# Patient Record
Sex: FEMALE | Race: WHITE | NOT HISPANIC OR LATINO | Employment: OTHER | URBAN - METROPOLITAN AREA
[De-identification: names, ages, dates, MRNs, and addresses within clinical notes are randomized per-mention and may not be internally consistent; named-entity substitution may affect disease eponyms.]

---

## 2017-09-22 ENCOUNTER — HOSPITAL ENCOUNTER (OUTPATIENT)
Dept: RADIOLOGY | Facility: MEDICAL CENTER | Age: 47
End: 2017-09-22
Attending: FAMILY MEDICINE
Payer: COMMERCIAL

## 2017-09-22 DIAGNOSIS — Z12.31 ENCOUNTER FOR SCREENING MAMMOGRAM FOR MALIGNANT NEOPLASM OF BREAST: ICD-10-CM

## 2017-09-22 DIAGNOSIS — R14.0 ABDOMINAL DISTENTION: ICD-10-CM

## 2017-09-22 PROCEDURE — G0202 SCR MAMMO BI INCL CAD: HCPCS

## 2017-09-29 ENCOUNTER — HOSPITAL ENCOUNTER (OUTPATIENT)
Dept: RADIOLOGY | Facility: MEDICAL CENTER | Age: 47
End: 2017-09-29
Attending: FAMILY MEDICINE
Payer: COMMERCIAL

## 2017-09-29 DIAGNOSIS — R14.0 ABDOMINAL DISTENTION: ICD-10-CM

## 2017-09-29 DIAGNOSIS — N92.6 IRREGULAR MENSTRUATION: ICD-10-CM

## 2017-09-29 PROCEDURE — 76700 US EXAM ABDOM COMPLETE: CPT

## 2017-09-29 PROCEDURE — 76830 TRANSVAGINAL US NON-OB: CPT

## 2017-10-11 ENCOUNTER — HOSPITAL ENCOUNTER (OUTPATIENT)
Dept: RADIOLOGY | Facility: MEDICAL CENTER | Age: 47
End: 2017-10-11

## 2017-11-30 ENCOUNTER — HOSPITAL ENCOUNTER (OUTPATIENT)
Dept: LAB | Facility: MEDICAL CENTER | Age: 47
End: 2017-11-30
Attending: FAMILY MEDICINE
Payer: COMMERCIAL

## 2017-11-30 LAB
T4 SERPL-MCNC: 6.9 UG/DL (ref 4–12)
TSH SERPL DL<=0.005 MIU/L-ACNC: 2.81 UIU/ML (ref 0.3–3.7)

## 2017-11-30 PROCEDURE — 84479 ASSAY OF THYROID (T3 OR T4): CPT

## 2017-11-30 PROCEDURE — 84443 ASSAY THYROID STIM HORMONE: CPT

## 2017-11-30 PROCEDURE — 36415 COLL VENOUS BLD VENIPUNCTURE: CPT

## 2017-11-30 PROCEDURE — 84436 ASSAY OF TOTAL THYROXINE: CPT

## 2017-12-02 LAB — T3RU NFR SERPL: 37 % (ref 28–41)

## 2018-01-03 ENCOUNTER — HOSPITAL ENCOUNTER (OUTPATIENT)
Dept: RADIOLOGY | Facility: MEDICAL CENTER | Age: 48
End: 2018-01-03
Attending: FAMILY MEDICINE
Payer: COMMERCIAL

## 2018-01-03 DIAGNOSIS — R92.8 ABNORMAL MAMMOGRAM: ICD-10-CM

## 2018-01-03 PROCEDURE — 76642 ULTRASOUND BREAST LIMITED: CPT | Mod: LT

## 2018-01-03 PROCEDURE — 77065 DX MAMMO INCL CAD UNI: CPT | Mod: LT

## 2018-03-02 ENCOUNTER — OFFICE VISIT (OUTPATIENT)
Dept: MEDICAL GROUP | Facility: MEDICAL CENTER | Age: 48
End: 2018-03-02
Payer: COMMERCIAL

## 2018-03-02 VITALS
RESPIRATION RATE: 16 BRPM | SYSTOLIC BLOOD PRESSURE: 112 MMHG | HEART RATE: 92 BPM | DIASTOLIC BLOOD PRESSURE: 64 MMHG | HEIGHT: 68 IN | TEMPERATURE: 97.8 F | OXYGEN SATURATION: 97 % | BODY MASS INDEX: 21.82 KG/M2 | WEIGHT: 144 LBS

## 2018-03-02 DIAGNOSIS — Z85.850 HISTORY OF THYROID CANCER: ICD-10-CM

## 2018-03-02 DIAGNOSIS — M79.10 MYALGIA: ICD-10-CM

## 2018-03-02 DIAGNOSIS — R07.9 CHEST PAIN, UNSPECIFIED TYPE: ICD-10-CM

## 2018-03-02 DIAGNOSIS — F51.01 PRIMARY INSOMNIA: ICD-10-CM

## 2018-03-02 DIAGNOSIS — Z13.220 SCREENING, LIPID: ICD-10-CM

## 2018-03-02 DIAGNOSIS — M25.50 ARTHRALGIA, UNSPECIFIED JOINT: ICD-10-CM

## 2018-03-02 DIAGNOSIS — D64.9 ANEMIA, UNSPECIFIED TYPE: ICD-10-CM

## 2018-03-02 DIAGNOSIS — E03.9 ACQUIRED HYPOTHYROIDISM: ICD-10-CM

## 2018-03-02 PROCEDURE — 99204 OFFICE O/P NEW MOD 45 MIN: CPT | Mod: 25 | Performed by: INTERNAL MEDICINE

## 2018-03-02 PROCEDURE — 93000 ELECTROCARDIOGRAM COMPLETE: CPT | Performed by: INTERNAL MEDICINE

## 2018-03-02 RX ORDER — LEVOTHYROXINE SODIUM 88 UG/1
TABLET ORAL
Status: ON HOLD | COMMUNITY
Start: 2018-02-02 | End: 2020-03-27 | Stop reason: SDUPTHER

## 2018-03-02 RX ORDER — LIOTHYRONINE SODIUM 5 UG/1
1 TABLET ORAL DAILY
COMMUNITY
Start: 2018-02-02 | End: 2018-04-13 | Stop reason: SDUPTHER

## 2018-03-02 ASSESSMENT — PATIENT HEALTH QUESTIONNAIRE - PHQ9: CLINICAL INTERPRETATION OF PHQ2 SCORE: 0

## 2018-03-02 NOTE — PROGRESS NOTES
CC: Establishing care arthralgias, hypothyroidism. .    HPI:  Rafaela presents with the following    1. Acquired hypothyroidism  Maintain on medications labs recent checked found to be slightly under replaced maintained on dual therapy. She is complaining of fatigue but no hair or skin changes    2. Arthralgia, unspecified joint  She is complaining of generalized myalgias as well as arthralgias. Affecting multiple joints and legs. She does report her sleep is difficult and she's had stresses with her 's health recently. Mother does have a history of lupus.    3. Myalgia  As discussed in #2.    4. History of thyroid cancer  No signs of metastasis.    5. Screening, lipid      6. Anemia, unspecified type  She does report she is having abnormal bleeding followed by gynecology has been slightly anemic.    7. Primary insomnia  She reports she does not wake up feeling rested but does not have a heavy snoring history.  does not claim that she stops breathing either. She does report some mild depressive symptoms because of her 's health recently diagnosed with prostate cancer.    8. Chest pain, unspecified type  Also complaining of several years of chest pain. In the left upper chest. She called at heart pain. She denies any pain with activity usually very short nature lasting seconds. Denies any radiation of symptoms and no nausea or vomiting. No associated shortness of breath      Patient Active Problem List    Diagnosis Date Noted   • Acquired hypothyroidism 03/02/2018   • History of thyroid cancer 03/02/2018   • Primary insomnia 03/02/2018       Current Outpatient Prescriptions   Medication Sig Dispense Refill   • levothyroxine (SYNTHROID) 88 MCG Tab      • liothyronine (CYTOMEL) 5 MCG Tab Take 1 Tab by mouth every day.       No current facility-administered medications for this visit.          Allergies as of 03/02/2018 - Reviewed 03/02/2018   Allergen Reaction Noted   • Anesthesia s-i-40  "[propofol]  03/02/2018        Social History     Social History   • Marital status:      Spouse name: N/A   • Number of children: N/A   • Years of education: N/A     Occupational History   • Not on file.     Social History Main Topics   • Smoking status: Never Smoker   • Smokeless tobacco: Never Used   • Alcohol use Yes      Comment: 1-2 glasses of wine/weekly   • Drug use: Unknown   • Sexual activity: Not on file     Other Topics Concern   • Not on file     Social History Narrative   • No narrative on file       Family History   Problem Relation Age of Onset   • Heart Disease Mother    • Cancer Mother    • Cancer Father    • Alcohol/Drug Brother        History reviewed. No pertinent surgical history.    ROS:  Denies any Headache,,  Shortness of breath,  Abdominal pain, Changes of bowel or bladder, Lower ext edema, Fevers, Nights sweats, Weight Changes, Focal weakness or numbness.  All other systems are negative.    /64   Pulse 92   Temp 36.6 °C (97.8 °F)   Resp 16   Ht 1.715 m (5' 7.5\")   Wt 65.3 kg (144 lb)   SpO2 97%   BMI 22.22 kg/m²      Physical Exam:  Gen:         Alert and oriented, No apparent distress.  HEENT:   Perrla, TM clear,  Oralpharynx no erythema or exudates.  Neck:       No Jugular venous distension, Lymphadenopathy, Thyromegaly, Bruits.  Lungs:     Clear to auscultation bilaterally  CV:          Regular rate and rhythm. No murmurs, rubs or gallops.  Abd:         Soft non tender, non distended. Normal active bowel sounds.             Ext:          No clubbing, cyanosis, edema.    EKG:  Normal sinus rythm, no acute st-t wave changes.    Assessment and Plan.   47 y.o. female presenting with the following.     1. Acquired hypothyroidism  Recheck blood work refilled medications.  - levothyroxine (SYNTHROID) 88 MCG Tab;   - liothyronine (CYTOMEL) 5 MCG Tab; Take 1 Tab by mouth every day.  - TSH; Future    2. Arthralgia, unspecified joint  Setting for autoimmune workup.  - CBC WITH " DIFFERENTIAL; Future  - RHEUMATOID ARTHRITIS FACTOR; Future  - WESTERGREN SED RATE; Future  - CCP ANTIBODY; Future  - ANU ANTIBODY WITH REFLEX; Future    3. Myalgia  Suspect components of fibromyalgia given lack of sleep and mood will await results of testing and see back when complete.    4. History of thyroid cancer  Manage expectantly    5. Screening, lipid    - COMP METABOLIC PANEL; Future  - LIPID PROFILE; Future    6. Anemia, unspecified type  Per report slightly anemic sent for workup  - VITAMIN B12; Future  - FOLATE; Future  - FERRITIN; Future  - IRON/TOTAL IRON BIND; Future    7. Primary insomnia  She will try melatonin over-the-counter certainly could be a large part of her fatigue.    8. Chest pain, unspecified type  EKG normal in office if develops further symptoms may consider stress echo but feel unlikely cardiac in nature.  - EKG - Clinic performed

## 2018-03-09 ENCOUNTER — HOSPITAL ENCOUNTER (OUTPATIENT)
Dept: LAB | Facility: MEDICAL CENTER | Age: 48
End: 2018-03-09
Attending: INTERNAL MEDICINE
Payer: COMMERCIAL

## 2018-03-09 DIAGNOSIS — E03.9 ACQUIRED HYPOTHYROIDISM: ICD-10-CM

## 2018-03-09 DIAGNOSIS — M25.50 ARTHRALGIA, UNSPECIFIED JOINT: ICD-10-CM

## 2018-03-09 DIAGNOSIS — D64.9 ANEMIA, UNSPECIFIED TYPE: ICD-10-CM

## 2018-03-09 DIAGNOSIS — Z13.220 SCREENING, LIPID: ICD-10-CM

## 2018-03-09 LAB
ALBUMIN SERPL BCP-MCNC: 4 G/DL (ref 3.2–4.9)
ALBUMIN/GLOB SERPL: 1.4 G/DL
ALP SERPL-CCNC: 30 U/L (ref 30–99)
ALT SERPL-CCNC: 11 U/L (ref 2–50)
ANION GAP SERPL CALC-SCNC: 8 MMOL/L (ref 0–11.9)
AST SERPL-CCNC: 14 U/L (ref 12–45)
BASOPHILS # BLD AUTO: 0.8 % (ref 0–1.8)
BASOPHILS # BLD: 0.05 K/UL (ref 0–0.12)
BILIRUB SERPL-MCNC: 0.7 MG/DL (ref 0.1–1.5)
BUN SERPL-MCNC: 13 MG/DL (ref 8–22)
CALCIUM SERPL-MCNC: 9.4 MG/DL (ref 8.5–10.5)
CHLORIDE SERPL-SCNC: 105 MMOL/L (ref 96–112)
CHOLEST SERPL-MCNC: 129 MG/DL (ref 100–199)
CO2 SERPL-SCNC: 23 MMOL/L (ref 20–33)
CREAT SERPL-MCNC: 0.79 MG/DL (ref 0.5–1.4)
EOSINOPHIL # BLD AUTO: 0.2 K/UL (ref 0–0.51)
EOSINOPHIL NFR BLD: 3.1 % (ref 0–6.9)
ERYTHROCYTE [DISTWIDTH] IN BLOOD BY AUTOMATED COUNT: 47.3 FL (ref 35.9–50)
ERYTHROCYTE [SEDIMENTATION RATE] IN BLOOD BY WESTERGREN METHOD: 13 MM/HOUR (ref 0–20)
FERRITIN SERPL-MCNC: 8.1 NG/ML (ref 10–291)
FOLATE SERPL-MCNC: 10.9 NG/ML
GLOBULIN SER CALC-MCNC: 2.9 G/DL (ref 1.9–3.5)
GLUCOSE SERPL-MCNC: 90 MG/DL (ref 65–99)
HCT VFR BLD AUTO: 40.2 % (ref 37–47)
HDLC SERPL-MCNC: 74 MG/DL
HGB BLD-MCNC: 13.3 G/DL (ref 12–16)
IMM GRANULOCYTES # BLD AUTO: 0.02 K/UL (ref 0–0.11)
IMM GRANULOCYTES NFR BLD AUTO: 0.3 % (ref 0–0.9)
IRON SATN MFR SERPL: 12 % (ref 15–55)
IRON SERPL-MCNC: 51 UG/DL (ref 40–170)
LDLC SERPL CALC-MCNC: 47 MG/DL
LYMPHOCYTES # BLD AUTO: 1.1 K/UL (ref 1–4.8)
LYMPHOCYTES NFR BLD: 17.2 % (ref 22–41)
MCH RBC QN AUTO: 30 PG (ref 27–33)
MCHC RBC AUTO-ENTMCNC: 33.1 G/DL (ref 33.6–35)
MCV RBC AUTO: 90.5 FL (ref 81.4–97.8)
MONOCYTES # BLD AUTO: 0.5 K/UL (ref 0–0.85)
MONOCYTES NFR BLD AUTO: 7.8 % (ref 0–13.4)
NEUTROPHILS # BLD AUTO: 4.54 K/UL (ref 2–7.15)
NEUTROPHILS NFR BLD: 70.8 % (ref 44–72)
NRBC # BLD AUTO: 0 K/UL
NRBC BLD-RTO: 0 /100 WBC
PLATELET # BLD AUTO: 258 K/UL (ref 164–446)
PMV BLD AUTO: 10.2 FL (ref 9–12.9)
POTASSIUM SERPL-SCNC: 3.9 MMOL/L (ref 3.6–5.5)
PROT SERPL-MCNC: 6.9 G/DL (ref 6–8.2)
RBC # BLD AUTO: 4.44 M/UL (ref 4.2–5.4)
RHEUMATOID FACT SER IA-ACNC: <10 IU/ML (ref 0–14)
SODIUM SERPL-SCNC: 136 MMOL/L (ref 135–145)
TIBC SERPL-MCNC: 416 UG/DL (ref 250–450)
TRIGL SERPL-MCNC: 39 MG/DL (ref 0–149)
TSH SERPL DL<=0.005 MIU/L-ACNC: 0.12 UIU/ML (ref 0.38–5.33)
VIT B12 SERPL-MCNC: 465 PG/ML (ref 211–911)
WBC # BLD AUTO: 6.4 K/UL (ref 4.8–10.8)

## 2018-03-09 PROCEDURE — 86431 RHEUMATOID FACTOR QUANT: CPT

## 2018-03-09 PROCEDURE — 83550 IRON BINDING TEST: CPT

## 2018-03-09 PROCEDURE — 85652 RBC SED RATE AUTOMATED: CPT

## 2018-03-09 PROCEDURE — 84443 ASSAY THYROID STIM HORMONE: CPT

## 2018-03-09 PROCEDURE — 86039 ANTINUCLEAR ANTIBODIES (ANA): CPT

## 2018-03-09 PROCEDURE — 36415 COLL VENOUS BLD VENIPUNCTURE: CPT

## 2018-03-09 PROCEDURE — 86200 CCP ANTIBODY: CPT

## 2018-03-09 PROCEDURE — 82746 ASSAY OF FOLIC ACID SERUM: CPT

## 2018-03-09 PROCEDURE — 86235 NUCLEAR ANTIGEN ANTIBODY: CPT | Mod: 91

## 2018-03-09 PROCEDURE — 86038 ANTINUCLEAR ANTIBODIES: CPT

## 2018-03-09 PROCEDURE — 85025 COMPLETE CBC W/AUTO DIFF WBC: CPT

## 2018-03-09 PROCEDURE — 80061 LIPID PANEL: CPT

## 2018-03-09 PROCEDURE — 86225 DNA ANTIBODY NATIVE: CPT

## 2018-03-09 PROCEDURE — 83540 ASSAY OF IRON: CPT

## 2018-03-09 PROCEDURE — 82728 ASSAY OF FERRITIN: CPT

## 2018-03-09 PROCEDURE — 82607 VITAMIN B-12: CPT

## 2018-03-09 PROCEDURE — 80053 COMPREHEN METABOLIC PANEL: CPT

## 2018-03-11 LAB — CCP IGG SERPL-ACNC: 4 UNITS (ref 0–19)

## 2018-03-14 LAB
NUCLEAR IGG SER QL IA: DETECTED
NUCLEAR IGG TITR SER IF: ABNORMAL {TITER}

## 2018-04-13 ENCOUNTER — OFFICE VISIT (OUTPATIENT)
Dept: MEDICAL GROUP | Facility: MEDICAL CENTER | Age: 48
End: 2018-04-13
Payer: COMMERCIAL

## 2018-04-13 VITALS
TEMPERATURE: 98.6 F | HEIGHT: 68 IN | DIASTOLIC BLOOD PRESSURE: 60 MMHG | WEIGHT: 147 LBS | BODY MASS INDEX: 22.28 KG/M2 | SYSTOLIC BLOOD PRESSURE: 102 MMHG | OXYGEN SATURATION: 96 % | RESPIRATION RATE: 16 BRPM | HEART RATE: 74 BPM

## 2018-04-13 DIAGNOSIS — M79.10 MYALGIA: ICD-10-CM

## 2018-04-13 DIAGNOSIS — Z12.83 SKIN CANCER SCREENING: ICD-10-CM

## 2018-04-13 DIAGNOSIS — D50.9 IRON DEFICIENCY ANEMIA, UNSPECIFIED IRON DEFICIENCY ANEMIA TYPE: ICD-10-CM

## 2018-04-13 DIAGNOSIS — E03.9 ACQUIRED HYPOTHYROIDISM: ICD-10-CM

## 2018-04-13 PROCEDURE — 99214 OFFICE O/P EST MOD 30 MIN: CPT | Performed by: INTERNAL MEDICINE

## 2018-04-13 RX ORDER — LIOTHYRONINE SODIUM 5 UG/1
2.5 TABLET ORAL DAILY
Qty: 30 TAB | Refills: 111
Start: 2018-04-13 | End: 2020-05-01 | Stop reason: SDUPTHER

## 2018-04-13 NOTE — PROGRESS NOTES
"CC: Follow-up thyroid myalgias.    HPI:   Rafaela presents today with the following.    1. Acquired hypothyroidism  Slightly over replaced maintain on dual therapy. Denying any here skin changes no palpitations.    2. Iron deficiency anemia, unspecified iron deficiency anemia type  Slightly iron deficient despite taking pills she is being followed by gynecology for irregular bleeding.    3. Myalgia  Myalgias still present ANU was positive however a titer of 1:80. She is noticing her muscle aches are activity depended with things such as shoveling snow and she is not overwhelmed by a specific location of pain.          Patient Active Problem List    Diagnosis Date Noted   • Iron deficiency anemia 04/13/2018   • Acquired hypothyroidism 03/02/2018   • History of thyroid cancer 03/02/2018   • Primary insomnia 03/02/2018       Current Outpatient Prescriptions   Medication Sig Dispense Refill   • liothyronine (CYTOMEL) 5 MCG Tab Take 0.5 Tabs by mouth every day. 30 Tab 111   • levothyroxine (SYNTHROID) 88 MCG Tab        No current facility-administered medications for this visit.          Allergies as of 04/13/2018 - Reviewed 04/13/2018   Allergen Reaction Noted   • Anesthesia s-i-40 [propofol]  03/02/2018        ROS: Denies Chest pain, SOB, LE edema.    /60   Pulse 74   Temp 37 °C (98.6 °F)   Resp 16   Ht 1.715 m (5' 7.5\")   Wt 66.7 kg (147 lb)   SpO2 96%   BMI 22.68 kg/m²      Physical Exam:  Gen:         Alert and oriented, No apparent distress.  Neck:        No Lymphadenopathy or Bruits.  Lungs:     Clear to auscultation bilaterally  CV:          Regular rate and rhythm. No murmurs, rubs or gallops.               Ext:          No clubbing, cyanosis, edema.      Assessment and Plan.   47 y.o. female with the following issues.    1. Acquired hypothyroidism  Decreasing medication to half tab recheck 6 weeks  - liothyronine (CYTOMEL) 5 MCG Tab; Take 0.5 Tabs by mouth every day.  Dispense: 30 Tab; Refill: " 111  - TSH; Future    2. Iron deficiency anemia, unspecified iron deficiency anemia type  Likely menstrual in etiology given that she is near 48 have placed referral into gastroenterology.  - REFERRAL TO GASTROENTEROLOGY    3. Myalgia  Manage expectantly.    4. Skin cancer screening    - REFERRAL TO DERMATOLOGY

## 2018-09-11 ENCOUNTER — HOSPITAL ENCOUNTER (OUTPATIENT)
Dept: LAB | Facility: MEDICAL CENTER | Age: 48
End: 2018-09-11
Attending: OPHTHALMOLOGY
Payer: COMMERCIAL

## 2018-09-11 LAB — RHEUMATOID FACT SER IA-ACNC: <10 IU/ML (ref 0–14)

## 2018-09-11 PROCEDURE — 86235 NUCLEAR ANTIGEN ANTIBODY: CPT | Mod: 91

## 2018-09-11 PROCEDURE — 86039 ANTINUCLEAR ANTIBODIES (ANA): CPT

## 2018-09-11 PROCEDURE — 86431 RHEUMATOID FACTOR QUANT: CPT

## 2018-09-11 PROCEDURE — 36415 COLL VENOUS BLD VENIPUNCTURE: CPT

## 2018-09-13 LAB
ENA SS-B IGG SER IA-ACNC: 0 AU/ML (ref 0–40)
SSA52 R0ENA AB IGG Q0420: 5 AU/ML (ref 0–40)
SSA60 R0ENA AB IGG Q0419: 0 AU/ML (ref 0–40)

## 2018-09-14 LAB — NUCLEAR IGG TITR SER IF: NORMAL {TITER}

## 2018-10-23 ENCOUNTER — OFFICE VISIT (OUTPATIENT)
Dept: MEDICAL GROUP | Facility: MEDICAL CENTER | Age: 48
End: 2018-10-23
Payer: COMMERCIAL

## 2018-10-23 VITALS
SYSTOLIC BLOOD PRESSURE: 98 MMHG | RESPIRATION RATE: 16 BRPM | DIASTOLIC BLOOD PRESSURE: 62 MMHG | OXYGEN SATURATION: 98 % | WEIGHT: 149 LBS | HEIGHT: 67 IN | TEMPERATURE: 97.3 F | HEART RATE: 82 BPM | BODY MASS INDEX: 23.39 KG/M2

## 2018-10-23 DIAGNOSIS — E03.9 ACQUIRED HYPOTHYROIDISM: ICD-10-CM

## 2018-10-23 DIAGNOSIS — D50.9 IRON DEFICIENCY ANEMIA, UNSPECIFIED IRON DEFICIENCY ANEMIA TYPE: ICD-10-CM

## 2018-10-23 DIAGNOSIS — J30.89 ALLERGIC RHINITIS DUE TO OTHER ALLERGIC TRIGGER, UNSPECIFIED SEASONALITY: ICD-10-CM

## 2018-10-23 DIAGNOSIS — Z91.018 FOOD ALLERGY: ICD-10-CM

## 2018-10-23 PROCEDURE — 99214 OFFICE O/P EST MOD 30 MIN: CPT | Performed by: INTERNAL MEDICINE

## 2018-10-23 NOTE — PROGRESS NOTES
"CC: Follow-up thyroid complaining of allergies, iron deficiency.    HPI:   Rafaela presents today with the following.    1. Acquired hypothyroidism  Presents maintain on dual therapy for her thyroid.  She is wanting to get endocrinology but does not been able to do so.  Her medication was reduced slightly last visit she did not notice any changes.  She is having significant dry eye however unclear if it is related to her thyroid.    2. Allergic rhinitis due to other allergic trigger, unspecified seasonality  She does have allergy type symptoms of congestion recently moving to the area did not have any allergies prior to moving here.    3. Food allergy  Also reports multiple food allergies with multiple proteins.    4. Iron deficiency anemia, unspecified iron deficiency anemia type  Was found to be slightly iron deficient.  She was not anemic but her ferritin was 8.  Denying any blood in her stool or dark tarry stool.  She was referred to gastroenterology she is now turned 48.      Patient Active Problem List    Diagnosis Date Noted   • Iron deficiency anemia 04/13/2018   • Acquired hypothyroidism 03/02/2018   • History of thyroid cancer 03/02/2018   • Primary insomnia 03/02/2018       Current Outpatient Prescriptions   Medication Sig Dispense Refill   • liothyronine (CYTOMEL) 5 MCG Tab Take 0.5 Tabs by mouth every day. 30 Tab 111   • levothyroxine (SYNTHROID) 88 MCG Tab        No current facility-administered medications for this visit.          Allergies as of 10/23/2018 - Reviewed 10/23/2018   Allergen Reaction Noted   • Anesthesia s-i-40 [propofol]  03/02/2018        ROS: Denies Chest pain, SOB, LE edema.    BP (!) 98/62   Pulse 82   Temp 36.3 °C (97.3 °F)   Resp 16   Ht 1.71 m (5' 7.32\")   Wt 67.6 kg (149 lb)   SpO2 98%   BMI 23.11 kg/m²     Physical Exam:  Gen:         Alert and oriented, No apparent distress.  Neck:        No Lymphadenopathy or Bruits.  Lungs:     Clear to auscultation bilaterally  CV: "          Regular rate and rhythm. No murmurs, rubs or gallops.               Ext:          No clubbing, cyanosis, edema.      Assessment and Plan.   48 y.o. female with the following issues.    1. Acquired hypothyroidism  Recheck thyroid referring to endocrinology.  - REFERRAL TO ENDOCRINOLOGY  - TSH; Future  - T3 FREE; Future  - FREE THYROXINE; Future    2. Allergic rhinitis due to other allergic trigger, unspecified seasonality  Referring to allergist recommend over-the-counter medications.  - REFERRAL TO ALLERGY    3. Food allergy  Again referring to allergist  - REFERRAL TO ALLERGY    4. Iron deficiency anemia, unspecified iron deficiency anemia type  Commended starting iron she will call back to schedule

## 2018-10-25 ENCOUNTER — HOSPITAL ENCOUNTER (OUTPATIENT)
Dept: LAB | Facility: MEDICAL CENTER | Age: 48
End: 2018-10-25
Attending: INTERNAL MEDICINE
Payer: COMMERCIAL

## 2018-10-25 DIAGNOSIS — E03.9 ACQUIRED HYPOTHYROIDISM: ICD-10-CM

## 2018-10-25 LAB
T3FREE SERPL-MCNC: 3.6 PG/ML (ref 2.4–4.2)
T4 FREE SERPL-MCNC: 0.97 NG/DL (ref 0.53–1.43)
TSH SERPL DL<=0.005 MIU/L-ACNC: 0.17 UIU/ML (ref 0.38–5.33)

## 2018-10-25 PROCEDURE — 84481 FREE ASSAY (FT-3): CPT

## 2018-10-25 PROCEDURE — 36415 COLL VENOUS BLD VENIPUNCTURE: CPT

## 2018-10-25 PROCEDURE — 84443 ASSAY THYROID STIM HORMONE: CPT

## 2018-10-25 PROCEDURE — 84439 ASSAY OF FREE THYROXINE: CPT

## 2019-01-16 ENCOUNTER — HOSPITAL ENCOUNTER (OUTPATIENT)
Dept: LAB | Facility: MEDICAL CENTER | Age: 49
End: 2019-01-16
Attending: INTERNAL MEDICINE
Payer: COMMERCIAL

## 2019-01-16 LAB
ANION GAP SERPL CALC-SCNC: 5 MMOL/L (ref 0–11.9)
BUN SERPL-MCNC: 7 MG/DL (ref 8–22)
CALCIUM SERPL-MCNC: 9 MG/DL (ref 8.5–10.5)
CHLORIDE SERPL-SCNC: 105 MMOL/L (ref 96–112)
CO2 SERPL-SCNC: 26 MMOL/L (ref 20–33)
CORTIS SERPL-MCNC: 8.1 UG/DL (ref 0–23)
CREAT SERPL-MCNC: 0.81 MG/DL (ref 0.5–1.4)
FASTING STATUS PATIENT QL REPORTED: NORMAL
GLUCOSE SERPL-MCNC: 99 MG/DL (ref 65–99)
POTASSIUM SERPL-SCNC: 3.7 MMOL/L (ref 3.6–5.5)
SODIUM SERPL-SCNC: 136 MMOL/L (ref 135–145)
T3 SERPL-MCNC: 107.4 NG/DL (ref 60–181)
T4 FREE SERPL-MCNC: 1.15 NG/DL (ref 0.53–1.43)
TSH SERPL DL<=0.005 MIU/L-ACNC: 0.13 UIU/ML (ref 0.38–5.33)

## 2019-01-16 PROCEDURE — 84443 ASSAY THYROID STIM HORMONE: CPT

## 2019-01-16 PROCEDURE — 82024 ASSAY OF ACTH: CPT

## 2019-01-16 PROCEDURE — 80048 BASIC METABOLIC PNL TOTAL CA: CPT

## 2019-01-16 PROCEDURE — 84480 ASSAY TRIIODOTHYRONINE (T3): CPT

## 2019-01-16 PROCEDURE — 82533 TOTAL CORTISOL: CPT

## 2019-01-16 PROCEDURE — 86800 THYROGLOBULIN ANTIBODY: CPT

## 2019-01-16 PROCEDURE — 84439 ASSAY OF FREE THYROXINE: CPT

## 2019-01-16 PROCEDURE — 36415 COLL VENOUS BLD VENIPUNCTURE: CPT

## 2019-01-19 LAB
ACTH PLAS-MCNC: 7 PG/ML (ref 6–58)
THYROGLOB AB SERPL-ACNC: <0.9 IU/ML (ref 0–4)

## 2019-01-23 LAB — MISCELLANEOUS LAB RESULT MISCLAB: ABNORMAL

## 2019-01-24 ENCOUNTER — HOSPITAL ENCOUNTER (OUTPATIENT)
Dept: LAB | Facility: MEDICAL CENTER | Age: 49
End: 2019-01-24
Attending: PHYSICIAN ASSISTANT
Payer: COMMERCIAL

## 2019-01-24 LAB
25(OH)D3 SERPL-MCNC: 42 NG/ML (ref 30–100)
BASOPHILS # BLD AUTO: 0.7 % (ref 0–1.8)
BASOPHILS # BLD: 0.06 K/UL (ref 0–0.12)
EOSINOPHIL # BLD AUTO: 0.35 K/UL (ref 0–0.51)
EOSINOPHIL NFR BLD: 4.2 % (ref 0–6.9)
ERYTHROCYTE [DISTWIDTH] IN BLOOD BY AUTOMATED COUNT: 49 FL (ref 35.9–50)
FERRITIN SERPL-MCNC: 13.8 NG/ML (ref 10–291)
HCT VFR BLD AUTO: 41.1 % (ref 37–47)
HGB BLD-MCNC: 14 G/DL (ref 12–16)
IMM GRANULOCYTES # BLD AUTO: 0.03 K/UL (ref 0–0.11)
IMM GRANULOCYTES NFR BLD AUTO: 0.4 % (ref 0–0.9)
IRON SATN MFR SERPL: 26 % (ref 15–55)
IRON SERPL-MCNC: 95 UG/DL (ref 40–170)
LYMPHOCYTES # BLD AUTO: 1.09 K/UL (ref 1–4.8)
LYMPHOCYTES NFR BLD: 13 % (ref 22–41)
MCH RBC QN AUTO: 32.2 PG (ref 27–33)
MCHC RBC AUTO-ENTMCNC: 34.1 G/DL (ref 33.6–35)
MCV RBC AUTO: 94.5 FL (ref 81.4–97.8)
MONOCYTES # BLD AUTO: 0.69 K/UL (ref 0–0.85)
MONOCYTES NFR BLD AUTO: 8.2 % (ref 0–13.4)
NEUTROPHILS # BLD AUTO: 6.16 K/UL (ref 2–7.15)
NEUTROPHILS NFR BLD: 73.5 % (ref 44–72)
NRBC # BLD AUTO: 0 K/UL
NRBC BLD-RTO: 0 /100 WBC
PLATELET # BLD AUTO: 213 K/UL (ref 164–446)
PMV BLD AUTO: 9.7 FL (ref 9–12.9)
RBC # BLD AUTO: 4.35 M/UL (ref 4.2–5.4)
TIBC SERPL-MCNC: 361 UG/DL (ref 250–450)
VIT B12 SERPL-MCNC: 788 PG/ML (ref 211–911)
WBC # BLD AUTO: 8.4 K/UL (ref 4.8–10.8)

## 2019-01-24 PROCEDURE — 82728 ASSAY OF FERRITIN: CPT

## 2019-01-24 PROCEDURE — 82784 ASSAY IGA/IGD/IGG/IGM EACH: CPT

## 2019-01-24 PROCEDURE — 82607 VITAMIN B-12: CPT

## 2019-01-24 PROCEDURE — 83516 IMMUNOASSAY NONANTIBODY: CPT

## 2019-01-24 PROCEDURE — 83540 ASSAY OF IRON: CPT

## 2019-01-24 PROCEDURE — 36415 COLL VENOUS BLD VENIPUNCTURE: CPT

## 2019-01-24 PROCEDURE — 82306 VITAMIN D 25 HYDROXY: CPT

## 2019-01-24 PROCEDURE — 85025 COMPLETE CBC W/AUTO DIFF WBC: CPT

## 2019-01-24 PROCEDURE — 83550 IRON BINDING TEST: CPT

## 2019-01-25 LAB
IGA SERPL-MCNC: 174 MG/DL (ref 68–408)
TTG IGA SER IA-ACNC: 0 U/ML (ref 0–3)

## 2019-01-28 LAB — UNCODED TEST RESULT 95040: NORMAL

## 2019-01-29 ENCOUNTER — OFFICE VISIT (OUTPATIENT)
Dept: MEDICAL GROUP | Facility: MEDICAL CENTER | Age: 49
End: 2019-01-29
Payer: COMMERCIAL

## 2019-01-29 VITALS
TEMPERATURE: 97.6 F | SYSTOLIC BLOOD PRESSURE: 122 MMHG | DIASTOLIC BLOOD PRESSURE: 64 MMHG | WEIGHT: 147 LBS | BODY MASS INDEX: 23.07 KG/M2 | HEIGHT: 67 IN | OXYGEN SATURATION: 98 % | HEART RATE: 92 BPM

## 2019-01-29 DIAGNOSIS — G56.03 BILATERAL CARPAL TUNNEL SYNDROME: ICD-10-CM

## 2019-01-29 PROCEDURE — 99213 OFFICE O/P EST LOW 20 MIN: CPT | Performed by: INTERNAL MEDICINE

## 2019-01-29 NOTE — PROGRESS NOTES
"CC: Wrist pain    HPI:   Rafaela presents today with the following.    1.Wrist pain  Presents complaining of bilateral wrist pain.  She reports symptoms are most severe at night frequently while sleeping.  She reports she wake up in the middle night with pain and numbness in her fingertips.  She does experience it during the day with episodes such as typing when she does a large amount of.  She denies any specific injury is not try to treat pain can be 6 out of 10 in intensity.  This is a new problem.      Patient Active Problem List    Diagnosis Date Noted   • Iron deficiency anemia 04/13/2018   • Acquired hypothyroidism 03/02/2018   • History of thyroid cancer 03/02/2018   • Primary insomnia 03/02/2018       Current Outpatient Prescriptions   Medication Sig Dispense Refill   • levothyroxine (SYNTHROID) 88 MCG Tab      • liothyronine (CYTOMEL) 5 MCG Tab Take 0.5 Tabs by mouth every day. (Patient not taking: Reported on 1/29/2019) 30 Tab 111     No current facility-administered medications for this visit.          Allergies as of 01/29/2019 - Reviewed 01/29/2019   Allergen Reaction Noted   • Anesthesia s-i-40 [propofol]  03/02/2018        ROS: Denies Chest pain, SOB, LE edema.    /64 (BP Location: Right arm, Patient Position: Sitting)   Pulse 92   Temp 36.4 °C (97.6 °F)   Ht 1.709 m (5' 7.3\")   Wt 66.7 kg (147 lb)   SpO2 98%   BMI 22.82 kg/m²     Physical Exam:  Gen:         Alert and oriented, No apparent distress.  Neck:        No Lymphadenopathy or Bruits.  Lungs:     Clear to auscultation bilaterally  CV:          Regular rate and rhythm. No murmurs, rubs or gallops.               Ext:          No clubbing, cyanosis, edema.  Wrist        positive Tinel and Phalen's.    Assessment and Plan.   48 y.o. female with the following issues.    1. Bilateral carpal tunnel syndrome  Symptoms consistent with bilateral carpal tunnel syndrome have recommended cockup wrist splints and topical anti-inflammatories " if not improving referred to orthopedics.  - REFERRAL TO ORTHOPEDICS

## 2019-03-11 ENCOUNTER — HOSPITAL ENCOUNTER (OUTPATIENT)
Dept: LAB | Facility: MEDICAL CENTER | Age: 49
End: 2019-03-11
Attending: PHYSICIAN ASSISTANT
Payer: COMMERCIAL

## 2019-03-11 ENCOUNTER — HOSPITAL ENCOUNTER (OUTPATIENT)
Dept: LAB | Facility: MEDICAL CENTER | Age: 49
End: 2019-03-11
Attending: INTERNAL MEDICINE
Payer: COMMERCIAL

## 2019-03-11 LAB
IRON SATN MFR SERPL: 37 % (ref 15–55)
IRON SERPL-MCNC: 131 UG/DL (ref 40–170)
T4 FREE SERPL-MCNC: 1.01 NG/DL (ref 0.53–1.43)
TIBC SERPL-MCNC: 357 UG/DL (ref 250–450)
TSH SERPL DL<=0.005 MIU/L-ACNC: 0.44 UIU/ML (ref 0.38–5.33)

## 2019-03-11 PROCEDURE — 36415 COLL VENOUS BLD VENIPUNCTURE: CPT

## 2019-03-11 PROCEDURE — 84443 ASSAY THYROID STIM HORMONE: CPT

## 2019-03-11 PROCEDURE — 83540 ASSAY OF IRON: CPT

## 2019-03-11 PROCEDURE — 83550 IRON BINDING TEST: CPT

## 2019-03-11 PROCEDURE — 84439 ASSAY OF FREE THYROXINE: CPT

## 2019-03-25 ENCOUNTER — APPOINTMENT (OUTPATIENT)
Dept: RADIOLOGY | Facility: MEDICAL CENTER | Age: 49
End: 2019-03-25
Attending: OBSTETRICS & GYNECOLOGY
Payer: COMMERCIAL

## 2019-04-16 ENCOUNTER — APPOINTMENT (OUTPATIENT)
Dept: RADIOLOGY | Facility: MEDICAL CENTER | Age: 49
End: 2019-04-16
Attending: OBSTETRICS & GYNECOLOGY
Payer: COMMERCIAL

## 2019-05-29 ENCOUNTER — HOSPITAL ENCOUNTER (OUTPATIENT)
Dept: RADIOLOGY | Facility: MEDICAL CENTER | Age: 49
End: 2019-05-29
Attending: OBSTETRICS & GYNECOLOGY
Payer: COMMERCIAL

## 2019-05-29 DIAGNOSIS — Z12.39 SCREENING BREAST EXAMINATION: ICD-10-CM

## 2019-05-29 PROCEDURE — 77063 BREAST TOMOSYNTHESIS BI: CPT

## 2019-09-16 ENCOUNTER — HOSPITAL ENCOUNTER (OUTPATIENT)
Dept: LAB | Facility: MEDICAL CENTER | Age: 49
End: 2019-09-16
Attending: INTERNAL MEDICINE
Payer: COMMERCIAL

## 2019-09-16 LAB
T4 FREE SERPL-MCNC: 0.82 NG/DL (ref 0.53–1.43)
TSH SERPL DL<=0.005 MIU/L-ACNC: 0.13 UIU/ML (ref 0.38–5.33)

## 2019-09-16 PROCEDURE — 36415 COLL VENOUS BLD VENIPUNCTURE: CPT

## 2019-09-16 PROCEDURE — 84432 ASSAY OF THYROGLOBULIN: CPT

## 2019-09-16 PROCEDURE — 86800 THYROGLOBULIN ANTIBODY: CPT

## 2019-09-16 PROCEDURE — 84439 ASSAY OF FREE THYROXINE: CPT

## 2019-09-16 PROCEDURE — 84443 ASSAY THYROID STIM HORMONE: CPT

## 2019-09-17 LAB — THYROGLOB AB SERPL-ACNC: <0.9 IU/ML (ref 0–4)

## 2019-10-03 LAB
THYROGLOB AB SERPL-ACNC: <0.9 IU/ML (ref 0–4)
THYROGLOB SERPL-MCNC: <0.1 NG/ML (ref 1.3–31.8)
THYROGLOB SERPL-MCNC: ABNORMAL NG/ML (ref 1.3–31.8)

## 2019-12-10 ENCOUNTER — OFFICE VISIT (OUTPATIENT)
Dept: MEDICAL GROUP | Facility: MEDICAL CENTER | Age: 49
End: 2019-12-10
Payer: COMMERCIAL

## 2019-12-10 VITALS
WEIGHT: 143 LBS | OXYGEN SATURATION: 97 % | TEMPERATURE: 97.3 F | DIASTOLIC BLOOD PRESSURE: 56 MMHG | BODY MASS INDEX: 22.44 KG/M2 | HEART RATE: 68 BPM | HEIGHT: 67 IN | SYSTOLIC BLOOD PRESSURE: 116 MMHG

## 2019-12-10 DIAGNOSIS — Z87.448 HISTORY OF CHANGES URINE OUTPUT CHANGES: ICD-10-CM

## 2019-12-10 DIAGNOSIS — H04.123 DRY EYES: ICD-10-CM

## 2019-12-10 DIAGNOSIS — Z85.850 HISTORY OF THYROID CANCER: ICD-10-CM

## 2019-12-10 DIAGNOSIS — E03.9 ACQUIRED HYPOTHYROIDISM: ICD-10-CM

## 2019-12-10 DIAGNOSIS — Z13.6 SCREENING FOR CARDIOVASCULAR CONDITION: ICD-10-CM

## 2019-12-10 PROCEDURE — 99213 OFFICE O/P EST LOW 20 MIN: CPT | Performed by: INTERNAL MEDICINE

## 2019-12-10 ASSESSMENT — PATIENT HEALTH QUESTIONNAIRE - PHQ9: CLINICAL INTERPRETATION OF PHQ2 SCORE: 0

## 2019-12-10 NOTE — PROGRESS NOTES
"      CC: Dry eyes and mouth with abnormal labs.                                                                                                                                      HPI:   Rafaela presents today with the following.    1. Dry eyes  Presents being followed by an eye doctor complaining of dry eyes and mouth.  ANU was negative as well as role in law she did have a positive salivary protein IgG however.  She has had persistent problems dry eyes and mouth denies any rashes arthralgias.  ANU again was negative.    2. Acquired hypothyroidism. History of thyroid cancer  History of thyroid cancer last TSH 0.5.  She is not happy with her current endocrinologist would like a new referral.        Patient Active Problem List    Diagnosis Date Noted   • Iron deficiency anemia 04/13/2018   • Acquired hypothyroidism 03/02/2018   • History of thyroid cancer 03/02/2018   • Primary insomnia 03/02/2018       Current Outpatient Medications   Medication Sig Dispense Refill   • liothyronine (CYTOMEL) 5 MCG Tab Take 0.5 Tabs by mouth every day. 30 Tab 111   • levothyroxine (SYNTHROID) 88 MCG Tab        No current facility-administered medications for this visit.          Allergies as of 12/10/2019 - Reviewed 12/10/2019   Allergen Reaction Noted   • Anesthesia s-i-40 [propofol]  03/02/2018        ROS: Denies Chest pain, SOB, LE edema.    /56 (BP Location: Right arm, Patient Position: Sitting)   Pulse 68   Temp 36.3 °C (97.3 °F)   Ht 1.709 m (5' 7.3\")   Wt 64.9 kg (143 lb)   SpO2 97%   BMI 22.20 kg/m²     Physical Exam:  Gen:         Alert and oriented, No apparent distress.  Neck:        No Lymphadenopathy or Bruits.  Lungs:     Clear to auscultation bilaterally  CV:          Regular rate and rhythm. No murmurs, rubs or gallops.               Ext:          No clubbing, cyanosis, edema.      Assessment and Plan.   49 y.o. female with the following issues.    1. Dry eyes  We will discuss with rheumatology antibody " slightly positive if they show some concern will refer.  Discussed case with rheumatologist they are also not excited about disease process she will follow-up if symptoms progress    2. Acquired hypothyroidism/thyroid cancer  Have referred to new endocrinologist.  - REFERRAL TO ENDOCRINOLOGY

## 2020-01-03 ENCOUNTER — HOSPITAL ENCOUNTER (OUTPATIENT)
Dept: LAB | Facility: MEDICAL CENTER | Age: 50
End: 2020-01-03
Attending: INTERNAL MEDICINE
Payer: COMMERCIAL

## 2020-01-03 DIAGNOSIS — Z85.850 HISTORY OF THYROID CANCER: ICD-10-CM

## 2020-01-03 DIAGNOSIS — Z87.448 HISTORY OF CHANGES URINE OUTPUT CHANGES: ICD-10-CM

## 2020-01-03 DIAGNOSIS — Z13.6 SCREENING FOR CARDIOVASCULAR CONDITION: ICD-10-CM

## 2020-01-03 LAB
ALBUMIN SERPL BCP-MCNC: 4.2 G/DL (ref 3.2–4.9)
ALBUMIN/GLOB SERPL: 1.4 G/DL
ALP SERPL-CCNC: 31 U/L (ref 30–99)
ALT SERPL-CCNC: 14 U/L (ref 2–50)
ANION GAP SERPL CALC-SCNC: 5 MMOL/L (ref 0–11.9)
APPEARANCE UR: ABNORMAL
AST SERPL-CCNC: 16 U/L (ref 12–45)
BACTERIA #/AREA URNS HPF: ABNORMAL /HPF
BILIRUB SERPL-MCNC: 1 MG/DL (ref 0.1–1.5)
BILIRUB UR QL STRIP.AUTO: NEGATIVE
BUN SERPL-MCNC: 9 MG/DL (ref 8–22)
CALCIUM SERPL-MCNC: 9.3 MG/DL (ref 8.5–10.5)
CHLORIDE SERPL-SCNC: 104 MMOL/L (ref 96–112)
CHOLEST SERPL-MCNC: 141 MG/DL (ref 100–199)
CO2 SERPL-SCNC: 28 MMOL/L (ref 20–33)
COLOR UR: YELLOW
CREAT SERPL-MCNC: 0.76 MG/DL (ref 0.5–1.4)
EPI CELLS #/AREA URNS HPF: ABNORMAL /HPF
FASTING STATUS PATIENT QL REPORTED: NORMAL
GLOBULIN SER CALC-MCNC: 3.1 G/DL (ref 1.9–3.5)
GLUCOSE SERPL-MCNC: 87 MG/DL (ref 65–99)
GLUCOSE UR STRIP.AUTO-MCNC: NEGATIVE MG/DL
HDLC SERPL-MCNC: 88 MG/DL
HYALINE CASTS #/AREA URNS LPF: ABNORMAL /LPF
KETONES UR STRIP.AUTO-MCNC: NEGATIVE MG/DL
LDLC SERPL CALC-MCNC: 40 MG/DL
LEUKOCYTE ESTERASE UR QL STRIP.AUTO: ABNORMAL
MICRO URNS: ABNORMAL
NITRITE UR QL STRIP.AUTO: NEGATIVE
PH UR STRIP.AUTO: 7 [PH] (ref 5–8)
POTASSIUM SERPL-SCNC: 3.6 MMOL/L (ref 3.6–5.5)
PROT SERPL-MCNC: 7.3 G/DL (ref 6–8.2)
PROT UR QL STRIP: NEGATIVE MG/DL
RBC # URNS HPF: ABNORMAL /HPF
RBC UR QL AUTO: ABNORMAL
SODIUM SERPL-SCNC: 137 MMOL/L (ref 135–145)
SP GR UR STRIP.AUTO: 1.01
TRIGL SERPL-MCNC: 63 MG/DL (ref 0–149)
UROBILINOGEN UR STRIP.AUTO-MCNC: 0.2 MG/DL
WBC #/AREA URNS HPF: ABNORMAL /HPF

## 2020-01-03 PROCEDURE — 80053 COMPREHEN METABOLIC PANEL: CPT

## 2020-01-03 PROCEDURE — 81001 URINALYSIS AUTO W/SCOPE: CPT

## 2020-01-03 PROCEDURE — 36415 COLL VENOUS BLD VENIPUNCTURE: CPT

## 2020-01-03 PROCEDURE — 80061 LIPID PANEL: CPT

## 2020-03-26 ENCOUNTER — OFFICE VISIT (OUTPATIENT)
Dept: MEDICAL GROUP | Facility: MEDICAL CENTER | Age: 50
End: 2020-03-26
Payer: COMMERCIAL

## 2020-03-26 ENCOUNTER — APPOINTMENT (OUTPATIENT)
Dept: RADIOLOGY | Facility: MEDICAL CENTER | Age: 50
DRG: 872 | End: 2020-03-26
Attending: EMERGENCY MEDICINE
Payer: COMMERCIAL

## 2020-03-26 ENCOUNTER — APPOINTMENT (OUTPATIENT)
Dept: RADIOLOGY | Facility: MEDICAL CENTER | Age: 50
DRG: 872 | End: 2020-03-26
Payer: COMMERCIAL

## 2020-03-26 ENCOUNTER — HOSPITAL ENCOUNTER (INPATIENT)
Facility: MEDICAL CENTER | Age: 50
LOS: 1 days | DRG: 872 | End: 2020-03-27
Attending: EMERGENCY MEDICINE | Admitting: HOSPITALIST
Payer: COMMERCIAL

## 2020-03-26 DIAGNOSIS — E03.9 ACQUIRED HYPOTHYROIDISM: ICD-10-CM

## 2020-03-26 DIAGNOSIS — J02.9 SORE THROAT: ICD-10-CM

## 2020-03-26 DIAGNOSIS — A41.9 SEPSIS WITHOUT ACUTE ORGAN DYSFUNCTION, DUE TO UNSPECIFIED ORGANISM (HCC): ICD-10-CM

## 2020-03-26 DIAGNOSIS — R05.9 COUGH: ICD-10-CM

## 2020-03-26 PROBLEM — R06.03 RESPIRATORY DISTRESS: Status: ACTIVE | Noted: 2020-03-26

## 2020-03-26 PROBLEM — E87.1 HYPONATREMIA: Status: ACTIVE | Noted: 2020-03-26

## 2020-03-26 LAB
ALBUMIN SERPL BCP-MCNC: 4.6 G/DL (ref 3.2–4.9)
ALBUMIN/GLOB SERPL: 1.2 G/DL
ALP SERPL-CCNC: 49 U/L (ref 30–99)
ALT SERPL-CCNC: 15 U/L (ref 2–50)
ANION GAP SERPL CALC-SCNC: 12 MMOL/L (ref 7–16)
ANION GAP SERPL CALC-SCNC: 22 MMOL/L (ref 7–16)
APPEARANCE UR: CLEAR
APPEARANCE UR: CLEAR
AST SERPL-CCNC: 28 U/L (ref 12–45)
BACTERIA #/AREA URNS HPF: ABNORMAL /HPF
BACTERIA #/AREA URNS HPF: ABNORMAL /HPF
BASOPHILS # BLD AUTO: 0.3 % (ref 0–1.8)
BASOPHILS # BLD: 0.03 K/UL (ref 0–0.12)
BILIRUB SERPL-MCNC: 1.1 MG/DL (ref 0.1–1.5)
BILIRUB UR QL STRIP.AUTO: NEGATIVE
BILIRUB UR QL STRIP.AUTO: NEGATIVE
BUN SERPL-MCNC: 15 MG/DL (ref 8–22)
BUN SERPL-MCNC: 17 MG/DL (ref 8–22)
CALCIUM SERPL-MCNC: 8.7 MG/DL (ref 8.4–10.2)
CALCIUM SERPL-MCNC: 9.6 MG/DL (ref 8.4–10.2)
CHLORIDE SERPL-SCNC: 74 MMOL/L (ref 96–112)
CHLORIDE SERPL-SCNC: 88 MMOL/L (ref 96–112)
CO2 SERPL-SCNC: 19 MMOL/L (ref 20–33)
CO2 SERPL-SCNC: 25 MMOL/L (ref 20–33)
COLOR UR: YELLOW
COLOR UR: YELLOW
CREAT SERPL-MCNC: 0.72 MG/DL (ref 0.5–1.4)
CREAT SERPL-MCNC: 0.98 MG/DL (ref 0.5–1.4)
EKG IMPRESSION: NORMAL
EOSINOPHIL # BLD AUTO: 0.01 K/UL (ref 0–0.51)
EOSINOPHIL NFR BLD: 0.1 % (ref 0–6.9)
EPI CELLS #/AREA URNS HPF: ABNORMAL /HPF
EPI CELLS #/AREA URNS HPF: ABNORMAL /HPF
ERYTHROCYTE [DISTWIDTH] IN BLOOD BY AUTOMATED COUNT: 42.2 FL (ref 35.9–50)
FLUAV RNA SPEC QL NAA+PROBE: NEGATIVE
FLUBV RNA SPEC QL NAA+PROBE: NEGATIVE
GLOBULIN SER CALC-MCNC: 3.7 G/DL (ref 1.9–3.5)
GLUCOSE SERPL-MCNC: 92 MG/DL (ref 65–99)
GLUCOSE SERPL-MCNC: 95 MG/DL (ref 65–99)
GLUCOSE UR STRIP.AUTO-MCNC: 100 MG/DL
GLUCOSE UR STRIP.AUTO-MCNC: NEGATIVE MG/DL
HCT VFR BLD AUTO: 48.5 % (ref 37–47)
HGB BLD-MCNC: 17.2 G/DL (ref 12–16)
IMM GRANULOCYTES # BLD AUTO: 0.05 K/UL (ref 0–0.11)
IMM GRANULOCYTES NFR BLD AUTO: 0.5 % (ref 0–0.9)
INR PPP: 0.95 (ref 0.87–1.13)
KETONES UR STRIP.AUTO-MCNC: NEGATIVE MG/DL
KETONES UR STRIP.AUTO-MCNC: NEGATIVE MG/DL
LACTATE BLD-SCNC: 1.4 MMOL/L (ref 0.5–2)
LACTATE BLD-SCNC: 2.2 MMOL/L (ref 0.5–2)
LACTATE BLD-SCNC: 5.5 MMOL/L (ref 0.5–2)
LEUKOCYTE ESTERASE UR QL STRIP.AUTO: ABNORMAL
LEUKOCYTE ESTERASE UR QL STRIP.AUTO: ABNORMAL
LYMPHOCYTES # BLD AUTO: 1.12 K/UL (ref 1–4.8)
LYMPHOCYTES NFR BLD: 10.6 % (ref 22–41)
MCH RBC QN AUTO: 31.5 PG (ref 27–33)
MCHC RBC AUTO-ENTMCNC: 35.5 G/DL (ref 33.6–35)
MCV RBC AUTO: 88.8 FL (ref 81.4–97.8)
MICRO URNS: ABNORMAL
MICRO URNS: ABNORMAL
MONOCYTES # BLD AUTO: 0.86 K/UL (ref 0–0.85)
MONOCYTES NFR BLD AUTO: 8.2 % (ref 0–13.4)
NEUTROPHILS # BLD AUTO: 8.47 K/UL (ref 2–7.15)
NEUTROPHILS NFR BLD: 80.3 % (ref 44–72)
NITRITE UR QL STRIP.AUTO: NEGATIVE
NITRITE UR QL STRIP.AUTO: POSITIVE
NRBC # BLD AUTO: 0 K/UL
NRBC BLD-RTO: 0 /100 WBC
PH UR STRIP.AUTO: 5.5 [PH] (ref 5–8)
PH UR STRIP.AUTO: 6.5 [PH] (ref 5–8)
PLATELET # BLD AUTO: 203 K/UL (ref 164–446)
PMV BLD AUTO: 9 FL (ref 9–12.9)
POTASSIUM SERPL-SCNC: 4.3 MMOL/L (ref 3.6–5.5)
POTASSIUM SERPL-SCNC: 4.3 MMOL/L (ref 3.6–5.5)
PROCALCITONIN SERPL-MCNC: 0.08 NG/ML
PROT SERPL-MCNC: 8.3 G/DL (ref 6–8.2)
PROT UR QL STRIP: NEGATIVE MG/DL
PROT UR QL STRIP: NEGATIVE MG/DL
PROTHROMBIN TIME: 12.8 SEC (ref 12–14.6)
RBC # BLD AUTO: 5.46 M/UL (ref 4.2–5.4)
RBC # URNS HPF: ABNORMAL /HPF
RBC # URNS HPF: ABNORMAL /HPF
RBC UR QL AUTO: NEGATIVE
RBC UR QL AUTO: NEGATIVE
SODIUM SERPL-SCNC: 115 MMOL/L (ref 135–145)
SODIUM SERPL-SCNC: 125 MMOL/L (ref 135–145)
SP GR UR STRIP.AUTO: <=1.005
SP GR UR STRIP.AUTO: <=1.005
WBC # BLD AUTO: 10.5 K/UL (ref 4.8–10.8)
WBC #/AREA URNS HPF: ABNORMAL /HPF
WBC #/AREA URNS HPF: ABNORMAL /HPF

## 2020-03-26 PROCEDURE — 87040 BLOOD CULTURE FOR BACTERIA: CPT

## 2020-03-26 PROCEDURE — 81001 URINALYSIS AUTO W/SCOPE: CPT

## 2020-03-26 PROCEDURE — 84145 PROCALCITONIN (PCT): CPT

## 2020-03-26 PROCEDURE — 700105 HCHG RX REV CODE 258: Performed by: HOSPITALIST

## 2020-03-26 PROCEDURE — 80053 COMPREHEN METABOLIC PANEL: CPT

## 2020-03-26 PROCEDURE — 93005 ELECTROCARDIOGRAM TRACING: CPT

## 2020-03-26 PROCEDURE — 85025 COMPLETE CBC W/AUTO DIFF WBC: CPT

## 2020-03-26 PROCEDURE — 80048 BASIC METABOLIC PNL TOTAL CA: CPT

## 2020-03-26 PROCEDURE — 83605 ASSAY OF LACTIC ACID: CPT | Mod: 91

## 2020-03-26 PROCEDURE — 700111 HCHG RX REV CODE 636 W/ 250 OVERRIDE (IP): Performed by: HOSPITALIST

## 2020-03-26 PROCEDURE — 87077 CULTURE AEROBIC IDENTIFY: CPT

## 2020-03-26 PROCEDURE — 87070 CULTURE OTHR SPECIMN AEROBIC: CPT

## 2020-03-26 PROCEDURE — A9270 NON-COVERED ITEM OR SERVICE: HCPCS | Performed by: HOSPITALIST

## 2020-03-26 PROCEDURE — 85610 PROTHROMBIN TIME: CPT

## 2020-03-26 PROCEDURE — 700111 HCHG RX REV CODE 636 W/ 250 OVERRIDE (IP): Performed by: EMERGENCY MEDICINE

## 2020-03-26 PROCEDURE — 36415 COLL VENOUS BLD VENIPUNCTURE: CPT

## 2020-03-26 PROCEDURE — 99285 EMERGENCY DEPT VISIT HI MDM: CPT

## 2020-03-26 PROCEDURE — 96375 TX/PRO/DX INJ NEW DRUG ADDON: CPT

## 2020-03-26 PROCEDURE — 71045 X-RAY EXAM CHEST 1 VIEW: CPT

## 2020-03-26 PROCEDURE — 700105 HCHG RX REV CODE 258: Performed by: EMERGENCY MEDICINE

## 2020-03-26 PROCEDURE — 99213 OFFICE O/P EST LOW 20 MIN: CPT | Mod: 95,CR | Performed by: INTERNAL MEDICINE

## 2020-03-26 PROCEDURE — 87186 SC STD MICRODIL/AGAR DIL: CPT

## 2020-03-26 PROCEDURE — 94760 N-INVAS EAR/PLS OXIMETRY 1: CPT

## 2020-03-26 PROCEDURE — 99223 1ST HOSP IP/OBS HIGH 75: CPT | Performed by: HOSPITALIST

## 2020-03-26 PROCEDURE — 87205 SMEAR GRAM STAIN: CPT

## 2020-03-26 PROCEDURE — 96374 THER/PROPH/DIAG INJ IV PUSH: CPT

## 2020-03-26 PROCEDURE — 87502 INFLUENZA DNA AMP PROBE: CPT

## 2020-03-26 PROCEDURE — 700102 HCHG RX REV CODE 250 W/ 637 OVERRIDE(OP): Performed by: HOSPITALIST

## 2020-03-26 PROCEDURE — 770020 HCHG ROOM/CARE - TELE (206)

## 2020-03-26 PROCEDURE — 87086 URINE CULTURE/COLONY COUNT: CPT

## 2020-03-26 RX ORDER — ONDANSETRON 2 MG/ML
4 INJECTION INTRAMUSCULAR; INTRAVENOUS ONCE
Status: COMPLETED | OUTPATIENT
Start: 2020-03-26 | End: 2020-03-26

## 2020-03-26 RX ORDER — FERROUS SULFATE 325(65) MG
325 TABLET ORAL DAILY
COMMUNITY
End: 2022-07-01

## 2020-03-26 RX ORDER — PROMETHAZINE HYDROCHLORIDE 25 MG/1
12.5-25 SUPPOSITORY RECTAL EVERY 4 HOURS PRN
Status: DISCONTINUED | OUTPATIENT
Start: 2020-03-26 | End: 2020-03-27 | Stop reason: HOSPADM

## 2020-03-26 RX ORDER — BENZONATATE 100 MG/1
100 CAPSULE ORAL 3 TIMES DAILY PRN
Qty: 30 CAP | Refills: 0 | Status: SHIPPED | OUTPATIENT
Start: 2020-03-26 | End: 2020-04-10

## 2020-03-26 RX ORDER — LIOTHYRONINE SODIUM 5 UG/1
2.5 TABLET ORAL DAILY
Status: DISCONTINUED | OUTPATIENT
Start: 2020-03-27 | End: 2020-03-27 | Stop reason: HOSPADM

## 2020-03-26 RX ORDER — SODIUM CHLORIDE, SODIUM LACTATE, POTASSIUM CHLORIDE, AND CALCIUM CHLORIDE .6; .31; .03; .02 G/100ML; G/100ML; G/100ML; G/100ML
3000 INJECTION, SOLUTION INTRAVENOUS ONCE
Status: COMPLETED | OUTPATIENT
Start: 2020-03-26 | End: 2020-03-26

## 2020-03-26 RX ORDER — BENZONATATE 100 MG/1
100 CAPSULE ORAL EVERY 4 HOURS PRN
Status: DISCONTINUED | OUTPATIENT
Start: 2020-03-26 | End: 2020-03-27 | Stop reason: HOSPADM

## 2020-03-26 RX ORDER — ONDANSETRON 4 MG/1
4 TABLET, ORALLY DISINTEGRATING ORAL EVERY 4 HOURS PRN
Status: DISCONTINUED | OUTPATIENT
Start: 2020-03-26 | End: 2020-03-27 | Stop reason: HOSPADM

## 2020-03-26 RX ORDER — POLYETHYLENE GLYCOL 3350 17 G/17G
1 POWDER, FOR SOLUTION ORAL
Status: DISCONTINUED | OUTPATIENT
Start: 2020-03-26 | End: 2020-03-27 | Stop reason: HOSPADM

## 2020-03-26 RX ORDER — GUAIFENESIN/DEXTROMETHORPHAN 100-10MG/5
10 SYRUP ORAL EVERY 6 HOURS PRN
Status: DISCONTINUED | OUTPATIENT
Start: 2020-03-26 | End: 2020-03-27 | Stop reason: HOSPADM

## 2020-03-26 RX ORDER — SODIUM CHLORIDE, SODIUM LACTATE, POTASSIUM CHLORIDE, CALCIUM CHLORIDE 600; 310; 30; 20 MG/100ML; MG/100ML; MG/100ML; MG/100ML
2000 INJECTION, SOLUTION INTRAVENOUS CONTINUOUS
Status: DISCONTINUED | OUTPATIENT
Start: 2020-03-26 | End: 2020-03-27 | Stop reason: HOSPADM

## 2020-03-26 RX ORDER — LEVOTHYROXINE SODIUM 88 UG/1
88 TABLET ORAL
Status: DISCONTINUED | OUTPATIENT
Start: 2020-03-27 | End: 2020-03-27 | Stop reason: HOSPADM

## 2020-03-26 RX ORDER — PROCHLORPERAZINE EDISYLATE 5 MG/ML
5-10 INJECTION INTRAMUSCULAR; INTRAVENOUS EVERY 4 HOURS PRN
Status: DISCONTINUED | OUTPATIENT
Start: 2020-03-26 | End: 2020-03-27 | Stop reason: HOSPADM

## 2020-03-26 RX ORDER — ONDANSETRON 2 MG/ML
4 INJECTION INTRAMUSCULAR; INTRAVENOUS EVERY 4 HOURS PRN
Status: DISCONTINUED | OUTPATIENT
Start: 2020-03-26 | End: 2020-03-27 | Stop reason: HOSPADM

## 2020-03-26 RX ORDER — LORAZEPAM 2 MG/ML
0.5 INJECTION INTRAMUSCULAR ONCE
Status: COMPLETED | OUTPATIENT
Start: 2020-03-26 | End: 2020-03-26

## 2020-03-26 RX ORDER — PROMETHAZINE HYDROCHLORIDE 25 MG/1
12.5-25 TABLET ORAL EVERY 4 HOURS PRN
Status: DISCONTINUED | OUTPATIENT
Start: 2020-03-26 | End: 2020-03-27 | Stop reason: HOSPADM

## 2020-03-26 RX ORDER — ACETAMINOPHEN 325 MG/1
650 TABLET ORAL EVERY 6 HOURS PRN
Status: DISCONTINUED | OUTPATIENT
Start: 2020-03-26 | End: 2020-03-27 | Stop reason: HOSPADM

## 2020-03-26 RX ORDER — AMOXICILLIN 250 MG
2 CAPSULE ORAL 2 TIMES DAILY
Status: DISCONTINUED | OUTPATIENT
Start: 2020-03-26 | End: 2020-03-27 | Stop reason: HOSPADM

## 2020-03-26 RX ORDER — BISACODYL 10 MG
10 SUPPOSITORY, RECTAL RECTAL
Status: DISCONTINUED | OUTPATIENT
Start: 2020-03-26 | End: 2020-03-27 | Stop reason: HOSPADM

## 2020-03-26 RX ORDER — AZITHROMYCIN 250 MG/1
500 TABLET, FILM COATED ORAL DAILY
Status: DISCONTINUED | OUTPATIENT
Start: 2020-03-26 | End: 2020-03-27 | Stop reason: HOSPADM

## 2020-03-26 RX ADMIN — ENOXAPARIN SODIUM 40 MG: 40 INJECTION SUBCUTANEOUS at 18:40

## 2020-03-26 RX ADMIN — ACETAMINOPHEN 650 MG: 325 TABLET, FILM COATED ORAL at 23:31

## 2020-03-26 RX ADMIN — ONDANSETRON 4 MG: 2 INJECTION INTRAMUSCULAR; INTRAVENOUS at 13:28

## 2020-03-26 RX ADMIN — BENZONATATE 100 MG: 100 CAPSULE ORAL at 23:42

## 2020-03-26 RX ADMIN — LORAZEPAM 0.5 MG: 2 INJECTION INTRAMUSCULAR; INTRAVENOUS at 13:26

## 2020-03-26 RX ADMIN — SODIUM CHLORIDE, POTASSIUM CHLORIDE, SODIUM LACTATE AND CALCIUM CHLORIDE 3000 ML: 600; 310; 30; 20 INJECTION, SOLUTION INTRAVENOUS at 13:24

## 2020-03-26 RX ADMIN — ONDANSETRON 4 MG: 2 INJECTION INTRAMUSCULAR; INTRAVENOUS at 19:34

## 2020-03-26 RX ADMIN — AZITHROMYCIN MONOHYDRATE 500 MG: 250 TABLET ORAL at 18:40

## 2020-03-26 RX ADMIN — SODIUM CHLORIDE, POTASSIUM CHLORIDE, SODIUM LACTATE AND CALCIUM CHLORIDE 2000 ML: 600; 310; 30; 20 INJECTION, SOLUTION INTRAVENOUS at 19:38

## 2020-03-26 RX ADMIN — CEFTRIAXONE SODIUM 2 G: 2 INJECTION, POWDER, FOR SOLUTION INTRAMUSCULAR; INTRAVENOUS at 18:38

## 2020-03-26 ASSESSMENT — COGNITIVE AND FUNCTIONAL STATUS - GENERAL
HELP NEEDED FOR BATHING: A LITTLE
WALKING IN HOSPITAL ROOM: A LITTLE
MOBILITY SCORE: 22
CLIMB 3 TO 5 STEPS WITH RAILING: A LITTLE
SUGGESTED CMS G CODE MODIFIER DAILY ACTIVITY: CJ
DAILY ACTIVITIY SCORE: 22
TOILETING: A LITTLE
SUGGESTED CMS G CODE MODIFIER MOBILITY: CJ

## 2020-03-26 ASSESSMENT — COPD QUESTIONNAIRES
DO YOU EVER COUGH UP ANY MUCUS OR PHLEGM?: NO/ONLY WITH OCCASIONAL COLDS OR INFECTIONS
HAVE YOU SMOKED AT LEAST 100 CIGARETTES IN YOUR ENTIRE LIFE: NO/DON'T KNOW
COPD SCREENING SCORE: 1
DURING THE PAST 4 WEEKS HOW MUCH DID YOU FEEL SHORT OF BREATH: NONE/LITTLE OF THE TIME

## 2020-03-26 ASSESSMENT — LIFESTYLE VARIABLES
HAVE PEOPLE ANNOYED YOU BY CRITICIZING YOUR DRINKING: NO
EVER_SMOKED: NEVER
CONSUMPTION TOTAL: NEGATIVE
EVER HAD A DRINK FIRST THING IN THE MORNING TO STEADY YOUR NERVES TO GET RID OF A HANGOVER: NO
ALCOHOL_USE: YES
AVERAGE NUMBER OF DAYS PER WEEK YOU HAVE A DRINK CONTAINING ALCOHOL: 3
EVER FELT BAD OR GUILTY ABOUT YOUR DRINKING: NO
TOTAL SCORE: 1
HAVE YOU EVER FELT YOU SHOULD CUT DOWN ON YOUR DRINKING: YES
EVER_SMOKED: NEVER
ON A TYPICAL DAY WHEN YOU DRINK ALCOHOL HOW MANY DRINKS DO YOU HAVE: 2
HOW MANY TIMES IN THE PAST YEAR HAVE YOU HAD 5 OR MORE DRINKS IN A DAY: 0
TOTAL SCORE: 1
TOTAL SCORE: 1

## 2020-03-26 ASSESSMENT — ENCOUNTER SYMPTOMS
EYE PAIN: 0
SENSORY CHANGE: 0
CLAUDICATION: 0
WEAKNESS: 1
PHOTOPHOBIA: 0
DIZZINESS: 0
ORTHOPNEA: 0
SPEECH CHANGE: 0
VOMITING: 0
BLOOD IN STOOL: 0
PND: 0
BACK PAIN: 0
CONSTIPATION: 0
CHILLS: 0
NERVOUS/ANXIOUS: 0
DOUBLE VISION: 0
NAUSEA: 0
FEVER: 1
HEARTBURN: 0
NECK PAIN: 0
TREMORS: 0
BLURRED VISION: 0
STRIDOR: 0
DEPRESSION: 0
HEADACHES: 0
SHORTNESS OF BREATH: 0
MEMORY LOSS: 0
SORE THROAT: 0
DIARRHEA: 1
COUGH: 0
PALPITATIONS: 0
HEMOPTYSIS: 0
TINGLING: 0
SPUTUM PRODUCTION: 0
MYALGIAS: 0

## 2020-03-26 ASSESSMENT — FIBROSIS 4 INDEX
FIB4 SCORE: 1.82
FIB4 SCORE: 1.82
FIB4 SCORE: 1.02

## 2020-03-26 ASSESSMENT — PATIENT HEALTH QUESTIONNAIRE - PHQ9
2. FEELING DOWN, DEPRESSED, IRRITABLE, OR HOPELESS: NOT AT ALL
1. LITTLE INTEREST OR PLEASURE IN DOING THINGS: NOT AT ALL
SUM OF ALL RESPONSES TO PHQ9 QUESTIONS 1 AND 2: 0

## 2020-03-26 NOTE — ED PROVIDER NOTES
ED Provider Note    CHIEF COMPLAINT  Chief Complaint   Patient presents with   • Cough   • N/V       HPI  Rafaela Rosa is a 49 y.o. female who presents with shortness of breath.  The patient states she has been sick over the last 3 days.  She states she is had a cough that is productive in nature with associated nausea, vomiting, and diarrhea.  She states she has generalized may lase and myalgias.  The patient states her temperature has been down.  She states that she was recently in [place] approximately 3 weeks ago and did have an air flight to [place].  She is unaware of any sick contacts.  She says she is otherwise healthy.  She does not smoke.    REVIEW OF SYSTEMS  See HPI for further details. All other systems are negative.     PAST MEDICAL HISTORY  No past medical history on file.    [facility]  [email or web address]    SOCIAL HISTORY  Social History     Socioeconomic History   • Marital status:      Spouse name: Not on file   • Number of children: Not on file   • Years of education: Not on file   • Highest education level: Not on file   Occupational History   • Not on file   Social Needs   • Financial resource strain: Not on file   • Food insecurity     Worry: Not on file     Inability: Not on file   • Transportation needs     Medical: Not on file     Non-medical: Not on file   Tobacco Use   • Smoking status: Never Smoker   • Smokeless tobacco: Never Used   Substance and Sexual Activity   • Alcohol use: Yes     Comment: 1-2 glasses of wine/weekly   • Drug use: Not on file   • Sexual activity: Not on file   Lifestyle   • Physical activity     Days per week: Not on file     Minutes per session: Not on file   • Stress: Not on file   Relationships   • Social connections     Talks on phone: Not on file     Gets together: Not on file     Attends Rastafarian service: Not on file     Active member of club or organization: Not on file     Attends meetings of clubs or organizations: Not on file     Relationship  "status: Not on file   • Intimate partner violence     Fear of current or ex partner: Not on file     Emotionally abused: Not on file     Physically abused: Not on file     Forced sexual activity: Not on file   Other Topics Concern   • Not on file   Social History Narrative   • Not on file       SURGICAL HISTORY  No past surgical history on file.    CURRENT MEDICATIONS  Home Medications    **Home medications have not yet been reviewed for this encounter**         ALLERGIES  Allergies   Allergen Reactions   • Anesthesia S-I-40 [Propofol]        PHYSICAL EXAM  VITAL SIGNS: Temp 36.7 °C (98 °F) (Temporal)   Ht 1.702 m (5' 7\")   Wt 64.4 kg (142 lb) Comment: ems  LMP 03/12/2020 (Within Weeks)   BMI 22.24 kg/m²       Constitutional: Ill in appearance.   HENT: Normocephalic, Atraumatic, Bilateral external ears normal, Oropharynx moist, No oral exudates, Nose normal.   Eyes: PERRLA, EOMI, Conjunctiva normal, No discharge.   Neck: Normal range of motion, No tenderness, Supple, No stridor.   Lymphatic: No lymphadenopathy noted.   Cardiovascular: Tachycardic heart rate, Normal rhythm, No murmurs, No rubs, No gallops.   Thorax & Lungs: Magically diminished throughout, mild diffuse rhonchi, tachypnea  Abdomen: Bowel sounds normal, Soft, No tenderness, No masses, No pulsatile masses.   Skin: Warm, Dry, No erythema, No rash.   Back: No tenderness, No CVA tenderness.   Extremities: Intact distal pulses, No edema, No tenderness, No cyanosis, No clubbing.    Neurologic: Alert & oriented x 3, Normal motor function, Normal sensory function, No focal deficits noted.   Psychiatric: Anxious.     Results for orders placed or performed during the hospital encounter of 03/26/20   CBC WITH DIFFERENTIAL   Result Value Ref Range    WBC 10.5 4.8 - 10.8 K/uL    RBC 5.46 (H) 4.20 - 5.40 M/uL    Hemoglobin 17.2 (H) 12.0 - 16.0 g/dL    Hematocrit 48.5 (H) 37.0 - 47.0 %    MCV 88.8 81.4 - 97.8 fL    MCH 31.5 27.0 - 33.0 pg    MCHC 35.5 (H) 33.6 - " 35.0 g/dL    RDW 42.2 35.9 - 50.0 fL    Platelet Count 203 164 - 446 K/uL    MPV 9.0 9.0 - 12.9 fL    Neutrophils-Polys 80.30 (H) 44.00 - 72.00 %    Lymphocytes 10.60 (L) 22.00 - 41.00 %    Monocytes 8.20 0.00 - 13.40 %    Eosinophils 0.10 0.00 - 6.90 %    Basophils 0.30 0.00 - 1.80 %    Immature Granulocytes 0.50 0.00 - 0.90 %    Nucleated RBC 0.00 /100 WBC    Neutrophils (Absolute) 8.47 (H) 2.00 - 7.15 K/uL    Lymphs (Absolute) 1.12 1.00 - 4.80 K/uL    Monos (Absolute) 0.86 (H) 0.00 - 0.85 K/uL    Eos (Absolute) 0.01 0.00 - 0.51 K/uL    Baso (Absolute) 0.03 0.00 - 0.12 K/uL    Immature Granulocytes (abs) 0.05 0.00 - 0.11 K/uL    NRBC (Absolute) 0.00 K/uL   COMP METABOLIC PANEL   Result Value Ref Range    Sodium 115 (LL) 135 - 145 mmol/L    Potassium 4.3 3.6 - 5.5 mmol/L    Chloride 74 (L) 96 - 112 mmol/L    Co2 19 (L) 20 - 33 mmol/L    Anion Gap 22.0 (H) 7.0 - 16.0    Glucose 95 65 - 99 mg/dL    Bun 17 8 - 22 mg/dL    Creatinine 0.98 0.50 - 1.40 mg/dL    Calcium 9.6 8.4 - 10.2 mg/dL    AST(SGOT) 28 12 - 45 U/L    ALT(SGPT) 15 2 - 50 U/L    Alkaline Phosphatase 49 30 - 99 U/L    Total Bilirubin 1.1 0.1 - 1.5 mg/dL    Albumin 4.6 3.2 - 4.9 g/dL    Total Protein 8.3 (H) 6.0 - 8.2 g/dL    Globulin 3.7 (H) 1.9 - 3.5 g/dL    A-G Ratio 1.2 g/dL   URINALYSIS   Result Value Ref Range    Color Yellow     Character Clear     Specific Gravity <=1.005 <1.035    Ph 5.5 5.0 - 8.0    Glucose 100 (A) Negative mg/dL    Ketones Negative Negative mg/dL    Protein Negative Negative mg/dL    Bilirubin Negative Negative    Nitrite Positive (A) Negative    Leukocyte Esterase Small (A) Negative    Occult Blood Negative Negative    Micro Urine Req Microscopic    LACTIC ACID   Result Value Ref Range    Lactic Acid 5.5 (HH) 0.5 - 2.0 mmol/L   URINE MICROSCOPIC (W/UA)   Result Value Ref Range    WBC 5-10 (A) /hpf    RBC Rare /hpf    Bacteria Many (A) None /hpf    Epithelial Cells Moderate (A) Few /hpf   ESTIMATED GFR   Result Value Ref Range     GFR If African American >60 >60 mL/min/1.73 m 2    GFR If Non African American 60 >60 mL/min/1.73 m 2   EKG   Result Value Ref Range    Report       [facility] Emergency Dept.    Test Date:  2020  Pt Name:    BRANDY LEAHY              Department: [facility]  MRN:        [id number]                      [facility]   Gender:     Female                       Technician: KANCHAN  :        [id number]                   Requested By:ER TRIAGE PROTOCOL  Order #:    [id number]                    Reading MD: DOROTEO GASPAR MD    Measurements  Intervals                                Axis  Rate:       86                           P:          76  DC:         136                          QRS:        92  QRSD:       84                           T:          77  QT:         392  QTc:        469    Interpretive Statements  Twelve-lead EKG shows a normal sinus rhythm with a ventricular rate 88, QRS  has  poor R wave progression, no ST segment elevation or depression, T waves  inverted  in V1  Electronically Signed On 3- 14:16:19 PDT by DOROTEO GASPAR MD         RADIOLOGY/PROCEDURES  DX-CHEST-PORTABLE (1 VIEW)   Final Result      No evidence of acute cardiopulmonary process.            COURSE & MEDICAL DECISION MAKING  Pertinent Labs & Imaging studies reviewed. (See chart for details)  This a 49-year-old female who presents the emerge department with difficulty with breathing.  The patient did appear ill on arrival.  Due to the respiratory symptoms we will treat the patient with Rocephin and azithromycin for possible pneumonia as a source of her sepsis.  COVID-19 would be in the differential.  On arrival the patient was placed in a negative pressure room and when I evaluated the patient I did have a gown, and 95 mask, gloves, and eye protection in place.  The patient has a concerning lactic acidosis and she is currently receiving resuscitation with intravenous fluids.  She has 2  peripheral intravenous lines placed and her hemodynamics have been stable.  Blood cultures are currently pending.  I did order covid-19 rule out on the patient and due to the lactic acidosis the patient will require admission to the ICU.  FINAL IMPRESSION  1.  Sepsis  2.  Critical care time 30 minutes    Disposition  The patient will be admitted in guarded condition         Electronically signed by: Silvio Gomez M.D., 3/26/2020 12:52 PM

## 2020-03-26 NOTE — H&P
Hospital Medicine History & Physical Note    Date of Service  3/26/2020    Primary Care Physician  Mukul Montero M.D.    Consultants  None    Code Status  Full Code    Chief Complaint  Chief Complaint   Patient presents with   • Cough   • N/V       History of Presenting Illness   is a very pleasant 49 y.o. female with a past medical history of hypothyroidism presented to the emergency room on 3/26/2020 for evaluation of generalized weakness and feeling sick over the past 3 days.  Patient said initially she developed a dry cough now has a productive cough with yellowish/greenish sputum.  She is also had some nausea vomiting and diarrhea as well.  She feels like her body is all tense and has myalgias.  She says she was recently in [place] 3 weeks ago and did have a flight to [facility].  She denies any obvious sick contacts around her.    I discussed the presenting symptoms, physical examination, lab and radiological study results with the emergency department physician.      Review of Systems  Review of Systems   Constitutional: Positive for fever and malaise/fatigue. Negative for chills.   HENT: Negative for congestion, hearing loss, sore throat and tinnitus.    Eyes: Negative for blurred vision, double vision, photophobia and pain.   Respiratory: Negative for cough, hemoptysis, sputum production, shortness of breath and stridor.    Cardiovascular: Negative for chest pain, palpitations, orthopnea, claudication and PND.   Gastrointestinal: Positive for diarrhea. Negative for blood in stool, constipation, heartburn, melena, nausea and vomiting.   Genitourinary: Negative for dysuria, frequency and urgency.   Musculoskeletal: Negative for back pain, myalgias and neck pain.   Neurological: Positive for weakness. Negative for dizziness, tingling, tremors, sensory change, speech change and headaches.   Psychiatric/Behavioral: Negative for depression, memory loss and suicidal ideas. The patient is not  nervous/anxious.    All other systems reviewed and are negative.      Past Medical History  Hypothyroidism    Surgical History  Denies history of significant surgical history.    Family History  family history includes Alcohol/Drug in her brother; Cancer in her father and mother; Heart Disease in her mother.      Social History   reports that she has never smoked. She has never used smokeless tobacco. She reports current alcohol use.    Allergies  Allergies   Allergen Reactions   • Anesthesia S-I-40 [Propofol]        Medications  Prior to Admission medications    Medication Sig Start Date End Date Taking? Authorizing Provider   benzonatate (TESSALON) 100 MG Cap Take 1 Cap by mouth 3 times a day as needed for Cough. 3/26/20   Mukul Montero M.D.   liothyronine (CYTOMEL) 5 MCG Tab Take 0.5 Tabs by mouth every day. 4/13/18   Mukul Montero M.D.   levothyroxine (SYNTHROID) 88 MCG Tab  2/2/18   Physician Outpatient       Physical Exam  Temp:  [36.7 °C (98 °F)] 36.7 °C (98 °F)  Pulse:  [84-94] 89  Resp:  [20-66] 26  BP: (119-160)/(82-99) 139/85  SpO2:  [75 %-99 %] 99 %  Physical Exam   Constitutional: She is oriented to person, place, and time. She appears well-developed. No distress.   HENT:   Head: Normocephalic and atraumatic.   Mouth/Throat: No oropharyngeal exudate.   Mucous membranes dry   Eyes: Pupils are equal, round, and reactive to light. Conjunctivae are normal. Right eye exhibits no discharge. No scleral icterus.   Neck: Neck supple. No JVD present. No thyromegaly present.   Cardiovascular: Intact distal pulses.   No murmur heard.  Pulmonary/Chest: Effort normal. No stridor. No respiratory distress. She has no wheezes. She has no rales.   Diffuse rhonchi throughout    Abdominal: Soft. Bowel sounds are normal. She exhibits no distension. There is no abdominal tenderness. There is no rebound.   Musculoskeletal: Normal range of motion.         General: No edema.   Neurological: She is alert and oriented to  person, place, and time. No cranial nerve deficit.   Skin: Skin is warm. She is not diaphoretic. No erythema.   Psychiatric: She has a normal mood and affect. Her behavior is normal. Thought content normal.   Nursing note and vitals reviewed.      Laboratory:  Recent Labs     03/26/20  1230   WBC 10.5   RBC 5.46*   HEMOGLOBIN 17.2*   HEMATOCRIT 48.5*   MCV 88.8   MCH 31.5   MCHC 35.5*   RDW 42.2   PLATELETCT 203   MPV 9.0     Recent Labs     03/26/20  1230 03/26/20  1455   SODIUM 115* 125*   POTASSIUM 4.3 4.3   CHLORIDE 74* 88*   CO2 19* 25   GLUCOSE 95 92   BUN 17 15   CREATININE 0.98 0.72   CALCIUM 9.6 8.7     Recent Labs     03/26/20  1230 03/26/20  1455   ALTSGPT 15  --    ASTSGOT 28  --    ALKPHOSPHAT 49  --    TBILIRUBIN 1.1  --    GLUCOSE 95 92     Recent Labs     03/26/20  1230   INR 0.95           Urinalysis:          Imaging:  DX-CHEST-PORTABLE (1 VIEW)   Final Result      No evidence of acute cardiopulmonary process.          Assessment/Plan:  I anticipate this patient will require at least two midnights for appropriate medical management, necessitating inpatient admission.    Respiratory distress  Assessment & Plan  Bacterial vs viral pneumonia?  R/o COVID   Influenza Neg.  CXR: neg for acute findings or infiltrates.   Pro-calcitonin pending.  Continue RT protocol, duo nebs, Pep therapy if warranted, and incentive spirometry.   IV ceftriaxone and azithromycin pending.    Sepsis (HCC)  Assessment & Plan  This is Sepsis Present on admission  SIRS criteria identified on my evaluation include: Fever, with temperature greater than 101 deg F and Tachycardia, with heart rate greater than 90 BPM  Source is Unclear, Bacterial pneumonia vs Viral such as COVID?  Initial UA was positive but possibly contaminant, repeat UA ordered  Sepsis protocol initiated  Fluid resuscitation ordered per protocol  IV antibiotics as appropriate for source of sepsis  While organ dysfunction may be noted elsewhere in this problem list  or in the chart, degree of organ dysfunction does not meet CMS criteria for severe sepsis          Hyponatremia  Assessment & Plan  Most likely hypovolemic hyponatremia.  Her sodium corrected from 115 to 125 in the ER.   I will start LR at 30cc/hr for slow infusion.  We will monitor her serum sodium q6hrs, to make sure correction no more than 8-12meq in next 24 hours.        VTE prophylaxis: Prophylaxis: lovenox

## 2020-03-26 NOTE — PROGRESS NOTES
This encounter was conducted via Zoom meeting  Verbal consent was obtained. Patient's identity was verified.       CC: Cough and sore throat.                                                                                                                                     HPI:   Rafaela presents today with the following.    1. Cough/ Sore throat  Made visit today to discuss her symptoms beginning approximately 4 days ago.  Began with some mild congestion progressing into cough slightly productive to greenish sputum as well as congestion with the same.  She has been taking her temperature no true fevers.  She is feeling weak and reports breathing is slightly labored.  She does not have a pulse ox nor any other underlying lung conditions.  She also is having sore throat as a large component of this.  She did go to [place] return 3 weeks ago and symptoms only began 4 days ago.  No other sick contacts she has been doing a good job of self isolation.  No other associated symptoms.  She does report some diarrhea beginning yesterday as well as some hand cramping.      Patient Active Problem List    Diagnosis Date Noted   • Iron deficiency anemia 04/13/2018   • Acquired hypothyroidism 03/02/2018   • History of thyroid cancer 03/02/2018   • Primary insomnia 03/02/2018       Current Outpatient Medications   Medication Sig Dispense Refill   • benzonatate (TESSALON) 100 MG Cap Take 1 Cap by mouth 3 times a day as needed for Cough. 30 Cap 0   • liothyronine (CYTOMEL) 5 MCG Tab Take 0.5 Tabs by mouth every day. 30 Tab 111   • levothyroxine (SYNTHROID) 88 MCG Tab        No current facility-administered medications for this visit.          Allergies as of 03/26/2020 - Reviewed 12/10/2019   Allergen Reaction Noted   • Anesthesia s-i-40 [propofol]  03/02/2018        ROS:  Denies, chest pain, changes to urine, Edema.         Physical Exam:  Constitutional: Alert, no distress,.  Skin: No rashes in visible areas.  Eye: Round.  Conjunctiva clear, lNo icterus.   ENMT: Lips pink without lesions, good dentition, moist mucous membranes. Phonation normal.  Neck: No masses, no thyromegaly. Moves freely without pain.  CV: Pulse as reported by patient  Respiratory: Unlabored respiratory effort, no cough or audible wheeze  Psych: Alert and oriented x3, normal affect and mood.          Assessment and Plan.   49 y.o. female with the following issues.    1. Cough/ Sore throat  She does not look to be in profound respiratory distress however have instructed her if her breathing should become worse she needs to go to the emergency room.  She does have a constellation of symptoms somewhat concerning for COVID but does not appear to be bad enough to require the emergency room at this time.  If symptoms should worsen in any way or she starts spiking fever she will get to the emergency room immediately.  Have written a prescription for cough medication as well as recommendations for Imodium and electrolyte replacement with sports drinks.  Again if there is any worsening she will report to the emergency room.  Have recommended isolating from family members to avoid spread.

## 2020-03-26 NOTE — ED NOTES
2nd IV started, labs drawn, cultures drawn, pt extremely anxious and cannot hold phone to talk to MD on phone.

## 2020-03-26 NOTE — ASSESSMENT & PLAN NOTE
Most likely hypovolemic hyponatremia.  Her sodium corrected from 115 to 125 in the ER.   I will start LR at 30cc/hr for slow infusion.  We will monitor her serum sodium q6hrs, to make sure correction no more than 8-12meq in next 24 hours.

## 2020-03-26 NOTE — ED TRIAGE NOTES
Pt drove her truck down to [place] last week, flew back home, when she came back home she had a productive cough and flu like symptoms, denies any fever, N/V/D that started yesterday

## 2020-03-26 NOTE — ASSESSMENT & PLAN NOTE
Bacterial vs viral pneumonia?  R/o COVID   Influenza Neg.  CXR: neg for acute findings or infiltrates.   Pro-calcitonin pending.  Continue RT protocol, duo nebs, Pep therapy if warranted, and incentive spirometry.   IV ceftriaxone and azithromycin pending.

## 2020-03-26 NOTE — ASSESSMENT & PLAN NOTE
This is Sepsis Present on admission  SIRS criteria identified on my evaluation include: Fever, with temperature greater than 101 deg F and Tachycardia, with heart rate greater than 90 BPM  Source is Unclear, Bacterial pneumonia vs Viral such as COVID?  Initial UA was positive but possibly contaminant, repeat UA ordered  Sepsis protocol initiated  Fluid resuscitation ordered per protocol  IV antibiotics as appropriate for source of sepsis  While organ dysfunction may be noted elsewhere in this problem list or in the chart, degree of organ dysfunction does not meet CMS criteria for severe sepsis

## 2020-03-26 NOTE — ED PROVIDER NOTES
ED Provider Note    This an addendum.  I misspoke my prior dictation the patient did not receive normal saline but instead received lactated Ringer's the patient was hypo-on a treatment.  Her sodium has corrected rather quickly on repeat metabolic panel and therefore the patient will be switched over to dextrose.  The patient has had significant improvement with intravenous hydration and after the 3 L her acidosis has corrected.  Therefore the patient be admitted to the medical floor.

## 2020-03-27 VITALS
DIASTOLIC BLOOD PRESSURE: 77 MMHG | HEIGHT: 67 IN | BODY MASS INDEX: 21.66 KG/M2 | WEIGHT: 138.01 LBS | OXYGEN SATURATION: 97 % | RESPIRATION RATE: 18 BRPM | TEMPERATURE: 98.1 F | SYSTOLIC BLOOD PRESSURE: 111 MMHG | HEART RATE: 86 BPM

## 2020-03-27 LAB
ALBUMIN SERPL BCP-MCNC: 3.4 G/DL (ref 3.2–4.9)
ALBUMIN/GLOB SERPL: 1.1 G/DL
ALP SERPL-CCNC: 39 U/L (ref 30–99)
ALT SERPL-CCNC: 13 U/L (ref 2–50)
ANION GAP SERPL CALC-SCNC: 14 MMOL/L (ref 7–16)
AST SERPL-CCNC: 19 U/L (ref 12–45)
BASOPHILS # BLD AUTO: 0.1 % (ref 0–1.8)
BASOPHILS # BLD: 0.01 K/UL (ref 0–0.12)
BILIRUB SERPL-MCNC: 0.3 MG/DL (ref 0.1–1.5)
BUN SERPL-MCNC: 13 MG/DL (ref 8–22)
CALCIUM SERPL-MCNC: 8.4 MG/DL (ref 8.4–10.2)
CHLORIDE SERPL-SCNC: 94 MMOL/L (ref 96–112)
CO2 SERPL-SCNC: 21 MMOL/L (ref 20–33)
CREAT SERPL-MCNC: 0.7 MG/DL (ref 0.5–1.4)
EOSINOPHIL # BLD AUTO: 0.02 K/UL (ref 0–0.51)
EOSINOPHIL NFR BLD: 0.3 % (ref 0–6.9)
ERYTHROCYTE [DISTWIDTH] IN BLOOD BY AUTOMATED COUNT: 43 FL (ref 35.9–50)
GLOBULIN SER CALC-MCNC: 3 G/DL (ref 1.9–3.5)
GLUCOSE SERPL-MCNC: 99 MG/DL (ref 65–99)
GRAM STN SPEC: NORMAL
HCT VFR BLD AUTO: 41.5 % (ref 37–47)
HGB BLD-MCNC: 14.6 G/DL (ref 12–16)
IMM GRANULOCYTES # BLD AUTO: 0.04 K/UL (ref 0–0.11)
IMM GRANULOCYTES NFR BLD AUTO: 0.5 % (ref 0–0.9)
LACTATE BLD-SCNC: 1.2 MMOL/L (ref 0.5–2)
LACTATE BLD-SCNC: 1.2 MMOL/L (ref 0.5–2)
LYMPHOCYTES # BLD AUTO: 0.89 K/UL (ref 1–4.8)
LYMPHOCYTES NFR BLD: 11.7 % (ref 22–41)
MCH RBC QN AUTO: 31.5 PG (ref 27–33)
MCHC RBC AUTO-ENTMCNC: 35.2 G/DL (ref 33.6–35)
MCV RBC AUTO: 89.6 FL (ref 81.4–97.8)
MONOCYTES # BLD AUTO: 0.77 K/UL (ref 0–0.85)
MONOCYTES NFR BLD AUTO: 10.1 % (ref 0–13.4)
NEUTROPHILS # BLD AUTO: 5.87 K/UL (ref 2–7.15)
NEUTROPHILS NFR BLD: 77.3 % (ref 44–72)
NRBC # BLD AUTO: 0 K/UL
NRBC BLD-RTO: 0 /100 WBC
PLATELET # BLD AUTO: 191 K/UL (ref 164–446)
PMV BLD AUTO: 9.6 FL (ref 9–12.9)
POTASSIUM SERPL-SCNC: 3.8 MMOL/L (ref 3.6–5.5)
PROT SERPL-MCNC: 6.4 G/DL (ref 6–8.2)
RBC # BLD AUTO: 4.63 M/UL (ref 4.2–5.4)
SIGNIFICANT IND 70042: NORMAL
SITE SITE: NORMAL
SODIUM SERPL-SCNC: 129 MMOL/L (ref 135–145)
SOURCE SOURCE: NORMAL
WBC # BLD AUTO: 7.6 K/UL (ref 4.8–10.8)

## 2020-03-27 PROCEDURE — 99239 HOSP IP/OBS DSCHRG MGMT >30: CPT | Performed by: INTERNAL MEDICINE

## 2020-03-27 PROCEDURE — 83605 ASSAY OF LACTIC ACID: CPT

## 2020-03-27 PROCEDURE — 700105 HCHG RX REV CODE 258: Performed by: HOSPITALIST

## 2020-03-27 PROCEDURE — A9270 NON-COVERED ITEM OR SERVICE: HCPCS | Performed by: HOSPITALIST

## 2020-03-27 PROCEDURE — 85025 COMPLETE CBC W/AUTO DIFF WBC: CPT

## 2020-03-27 PROCEDURE — 700111 HCHG RX REV CODE 636 W/ 250 OVERRIDE (IP): Performed by: HOSPITALIST

## 2020-03-27 PROCEDURE — 700102 HCHG RX REV CODE 250 W/ 637 OVERRIDE(OP): Performed by: HOSPITALIST

## 2020-03-27 PROCEDURE — 80053 COMPREHEN METABOLIC PANEL: CPT

## 2020-03-27 RX ORDER — BENZONATATE 100 MG/1
100 CAPSULE ORAL 3 TIMES DAILY PRN
Qty: 30 CAP | Refills: 0 | Status: SHIPPED | OUTPATIENT
Start: 2020-03-27 | End: 2020-04-10

## 2020-03-27 RX ORDER — LOPERAMIDE HYDROCHLORIDE 2 MG/1
2 CAPSULE ORAL 4 TIMES DAILY PRN
Qty: 30 CAP | Refills: 1 | COMMUNITY
Start: 2020-03-27 | End: 2020-09-16

## 2020-03-27 RX ORDER — GUAIFENESIN/DEXTROMETHORPHAN 100-10MG/5
10 SYRUP ORAL EVERY 6 HOURS PRN
Qty: 840 ML | Refills: 1 | COMMUNITY
Start: 2020-03-27 | End: 2020-05-01

## 2020-03-27 RX ORDER — LEVOTHYROXINE SODIUM 0.07 MG/1
75 TABLET ORAL
Qty: 30 TAB | Refills: 1 | Status: SHIPPED | OUTPATIENT
Start: 2020-03-27 | End: 2020-05-01 | Stop reason: SDUPTHER

## 2020-03-27 RX ADMIN — LIOTHYRONINE SODIUM 2.5 MCG: 5 TABLET ORAL at 05:08

## 2020-03-27 RX ADMIN — AZITHROMYCIN MONOHYDRATE 500 MG: 250 TABLET ORAL at 05:08

## 2020-03-27 RX ADMIN — CEFTRIAXONE SODIUM 2 G: 2 INJECTION, POWDER, FOR SOLUTION INTRAMUSCULAR; INTRAVENOUS at 05:08

## 2020-03-27 RX ADMIN — LEVOTHYROXINE SODIUM 88 MCG: 88 TABLET ORAL at 05:09

## 2020-03-27 RX ADMIN — ENOXAPARIN SODIUM 40 MG: 40 INJECTION SUBCUTANEOUS at 05:09

## 2020-03-27 ASSESSMENT — FIBROSIS 4 INDEX: FIB4 SCORE: 1.41

## 2020-03-27 NOTE — PROGRESS NOTES
Report received from DEUCE Herrera. Plan of care discussed. Patient resting comfortably in bed, declines any further needs at this time. Safety precautions in place.

## 2020-03-27 NOTE — CARE PLAN
Problem: Communication  Goal: The ability to communicate needs accurately and effectively will improve  3/26/2020 1909 by Javier Sage R.N.  Outcome: PROGRESSING AS EXPECTED  3/26/2020 1803 by Javier Sage R.N.  Outcome: PROGRESSING AS EXPECTED   Pt verbalizes needs  Problem: Safety  Goal: Will remain free from injury  3/26/2020 1909 by Javier Sage R.N.  Outcome: PROGRESSING AS EXPECTED  3/26/2020 1803 by Javier Sage R.N.  Outcome: PROGRESSING AS EXPECTED   Safety precautions in place

## 2020-03-27 NOTE — CARE PLAN
Problem: Communication  Goal: The ability to communicate needs accurately and effectively will improve  Outcome: PROGRESSING AS EXPECTED  Intervention: [place] patient and significant other/support system to call light to alert staff of needs  Flowsheets (Taken 3/26/2020 2058)  Oriented to:: All of the Following : Location of Bathroom, Visiting Policy, Unit Routine, Call Light and Bedside Controls, Bedside Rail Policy, Smoking Policy, Rights and Responsibilities, Bedside Report, and Patient Education Notebook     Problem: Infection  Goal: Will remain free from infection  Outcome: PROGRESSING AS EXPECTED  Note: Pt educated on droplet precautions and when to wear her mask.  Discussed hand and oral hygiene and their roll in infection prevention.  Airborne and contact precautions observed for COVID r/o.

## 2020-03-27 NOTE — PROGRESS NOTES
Pt discharged to home. Discharge instructions provided to pt. Pt verbalizes understanding. Pt states all questions have been answered. Signed copy in chart. Prescriptions sent to [facility]. Pt states that all personal belongings are in possession. Pt off unit via self, escorted by self. Patient provided with mask to use outside of patient room.

## 2020-03-27 NOTE — PROGRESS NOTES
Pt arrived to unit via gurney. Ambulated from [place] to bed, RN assist. Tele monitor applied, vitals taken. Pt assessed. A&O x4. Admit profile and med rec complete. Discussed POC with pt. Welcome folder provided and discussed. Communication board filled out. Questions and concerns addressed, verbalized understanding. Fall precautions in place. Pt demonstrates ability to use call light appropriately. Pt left in lowest position. Bed locked and low, bed alarm on.

## 2020-03-27 NOTE — DISCHARGE INSTRUCTIONS
Discharge Instructions per Jolanta Frazier D.O.    Stay in isolation for 14 days  Take Imodium as needed for diarrhea  Stay hydrated with water and electrolyte containing fluids  Take Tylenol as needed for fever.    DIET: As tolerated    ACTIVITY: As tolerated    DIAGNOSIS: Viral illness    Return to ER if worsening fever, confusion or difficulty breathing.    Discharge Instructions    Discharged to home by car with relative. Discharged via wheelchair, hospital escort: Yes.  Special equipment needed: Not Applicable    Be sure to schedule a follow-up appointment with your primary care doctor or any specialists as instructed.     Discharge Plan:   Diet Plan: Discussed  Activity Level: Discussed  Confirmed Follow up Appointment: Patient to Call and Schedule Appointment  Confirmed Symptoms Management: Discussed  Medication Reconciliation Updated: Yes  Influenza Vaccine Indication: Patient Refuses    I understand that a diet low in cholesterol, fat, and sodium is recommended for good health. Unless I have been given specific instructions below for another diet, I accept this instruction as my diet prescription.   Other diet: Regular, Increase fluid intake    Special Instructions: None    · Is patient discharged on Warfarin / Coumadin?   No             Viral syndrome and Novel Coronavirus (COVID-19)       You have a viral syndrome which may include symptoms like muscle aches, fevers, chills, runny nose, cough, sneezing, sore throat, vomiting or diarrhea.  One of the potential viruses you may have is SARSCoV-2, the virus that causes COVID-19, also known as the novel coronavirus.  You may be just as likely to have a different viral infection such as the common cold or flu.  Most patients with COVID -19 have mild symptoms and recover on their own. Resting, staying hydrated, and sleeping are typically helpful.  As of today's visit, you are well enough to go home and treat your symptoms with oral fluids, medicines for  fevers, cough, pain, etc.        COVID 19 testing is not performed on most people with mild symptoms who are being discharged from the Emergency Department or Clinic.      If COVID 19 testing was performed, the results will not be available for up to 4 days.  Please DO NOT CONTACT THE EMERGENCY DEPARTMENT OR CLINIC FOR RESULTS OF THIS TEST.       You will be contacted by a member of the [facility] team with your results and for further discussion.       Please follow the precautions below:      • Stay home except to get medical care.   • As advised by the Centers for Disease Control and Prevention (CDC), we recommend you stay in your home and minimize contact with others to avoid spreading this infection.   • The elderly or anyone with significant medical issues may have more severe symptoms from this infection. We recommend separation, also known as self-isolation, for at least 7 days after your first day of symptoms and several more after that if you are still sick. The most important action is wait for at least  a week and several more days after you feel well before returning to you regular activities, work or school. If you become sicker, like difficulty breathing, chest pain, you are unable to eat or drink enough, or have severe vomiting, diarrhea or weakness, you may need to return to the Emergency Department or contact your clinic provider for re-evaluation.    • You should restrict activities outside your home, except for getting medical care. Do not go to work, school, or public areas. Avoid using public transportation, ride-sharing, or taxis.   • Separate yourself from other people and animals in your home.   • As much as possible, you should stay in a specific room and away from other people in your home. Also, you should use a separate bathroom, if available.   • Avoid sharing personal household items. You should not share dishes, drinking glasses, cups, eating utensils, towels, or bedding with other  "people in your home. After using these items, they should be washed thoroughly with soap and water.         Precautions continued:    • Clean all \"high-touch\" surfaces every day high touch surfaces include counters, tabletops, doorknobs, bathroom fixtures, toilets, phones, keyboards, tablets, and bedside tables. Also, clean any surfaces that may have blood, stool, or body fluids on them. Use a household cleaning   spray or wipe, according to the label instructions. Labels contain instructions for safe and effective use of the cleaning product including precautions you should take when applying the product, such as wearing gloves and making sure you have good ventilation during use of the product.   • Clean your hands often. Wash your hands often with soap and water for at least 20 seconds. If soap and water are not available, clean your hands with an alcohol-based hand  that contains at least 60% alcohol, covering all surfaces of your hands and rubbing them together until they feel dry. Soap and water should be used preferentially if hands are visibly dirty. Avoid touching your eyes, nose, and mouth with unwashed hands.   • Cover your coughs and sneezes    • Cover your mouth and nose with a tissue when you cough or sneeze   • Throw used tissues in a lined trash can; immediately wash your hands with soap and water for at least 20 seconds or clean your hands with an alcohol-based hand  that contains at least 60 to 95% alcohol, covering all surfaces of your hands and rubbing them together until they feel dry. Soap and water should be used preferentially if hands are visibly dirty.     • When seeking care at a healthcare facility:    · Seek prompt medical attention if your illness is worsening (e.g., difficulty breathing).    · Put on a facemask before you enter the facility.    · These steps will help the healthcare provider's office to keep other people in the office or waiting room from getting " infected or exposed.    · If possible, put on a facemask before emergency medical services arrive.      Please see the resources below for more information.      CDC Corona Website https://www.cdc.gov/coronavirus/2019-ncov/index.html    General Information https://www.cdc.gov/coronavirus/2019-ncov/faq.html      [facility]: 348.501.0099     [facility] Line [phone]    Self-Isolating at Home  If it is determined that you do not need to be hospitalized and can be isolated at home please follow the follow the prevention steps below as based on CDC guidelines.  Stay home except to get medical care  People who are mildly ill with unconfirmed COVID-19 or have any other infectious respiratory illness are able to isolate at home during their illness. You should restrict activities outside your home, except for getting medical care. Do not go to work, school, or public areas. Avoid using public transportation, ride-sharing, or taxis.  Call ahead before visiting your doctor  If you have a medical appointment, call the healthcare provider and tell them that you have or may have unconfirmed COVID-19 or another possibly contagious respiratory illness. This will help the healthcare provider’s office take steps to keep other people from getting infected or exposed.  Separate yourself from other people and animals in your home  As much as possible, you should stay in a specific room and away from other people in your home. Also, you should use a separate bathroom, if available.  You should restrict contact with pets and other animals while you are sick, just like you would around other people. When possible, have another member of your household care for your animals while you are sick. If you must care for your pet or be around animals while you are sick, wash your hands before and after you interact with pets.  Wear a facemask  You should wear a facemask when you are around other people (e.g.,  sharing a room or vehicle) or pets and before you enter a healthcare provider’s office. If you are not able to wear a facemask (for example, because it causes trouble breathing), then people who live with you should not stay in the same room with you, or they should wear a facemask if they enter your room.  Cover your coughs and sneezes  Cover your mouth and nose with a tissue when you cough or sneeze. Throw used tissues in a lined trash can. Immediately wash your hands with soap and water for at least 20 seconds or, if soap and water are not available, clean your hands with an alcohol-based hand  that contains at least 60% alcohol.  Clean your hands often  Wash your hands often with soap and water for at least 20 seconds, especially after blowing your nose, coughing, or sneezing; going to the bathroom; and before eating or preparing food. If soap and water are not readily available, use an alcohol-based hand  with at least 60% alcohol, covering all surfaces of your hands and rubbing them together until they feel dry.  Soap and water are the best option if hands are visibly dirty. Avoid touching your eyes, nose, and mouth with unwashed hands.  Avoid sharing personal household items  You should not share dishes, drinking glasses, cups, eating utensils, towels, or bedding with other people or pets in your home. After using these items, they should be washed thoroughly with soap and water.  Clean all “high-touch” surfaces everyday  High touch surfaces include counters, tabletops, doorknobs, bathroom fixtures, toilets, phones, keyboards, tablets, and bedside tables. Also, clean any surfaces that may have blood, stool, or body fluids on them. Use a household cleaning spray or wipe, according to the label instructions. Labels contain instructions for safe and effective use of the cleaning product including precautions you should take when applying the product, such as wearing gloves and making sure you  have good ventilation during use of the product.  Monitor your symptoms  Seek prompt medical attention if your illness is worsening (e.g., difficulty breathing). Before seeking care, call your healthcare provider and tell them that you have, or are being evaluated for, unconfirmed COVID-19 or another infectious respiratory illness. Put on a facemask before you enter the facility. These steps will help the healthcare provider’s office to keep other people in the office or waiting room from getting infected or exposed. Ask your healthcare provider to call the local or [facility] health department. Persons who are placed under active monitoring or facilitated self-monitoring should follow instructions provided by their local health department or occupational health professionals, as appropriate. When working with your local health department check their available hours.  If you have a medical emergency and need to call 911, notify the dispatch personnel that you have, or are being evaluated for unconfirmed COVID-19 or another infectious respiratory illness. If possible, put on a facemask before emergency medical services arrive.  Discontinuing home isolation  Patients with unconfirmed COVID-19 or other infectious respiratory illnesses should remain under home isolation precautions until the risk of secondary transmission to others is thought to be low. In general that means 72 hours after fever resolves without the use of fever reducing medications, AND symptoms have improved AND at least 7 days since symptoms first appeared. If you have questions or concerns consult your healthcare providers or your local health department.  Per CDC guidelines, you are not required to provide a healthcare provider’s note to validate your illness or to return to work, as healthcare provider offices and medical facilities may be extremely busy and not able to provide such documentation in a timely way.        Depression / Suicide Risk    As  you are discharged from this [facility] facility, it is important to learn how to keep safe from harming yourself.    Recognize the warning signs:  · Abrupt changes in personality, positive or negative- including increase in energy   · Giving away possessions  · Change in eating patterns- significant weight changes-  positive or negative  · Change in sleeping patterns- unable to sleep or sleeping all the time   · Unwillingness or inability to communicate  · Depression  · Unusual sadness, discouragement and loneliness  · Talk of wanting to die  · Neglect of personal appearance   · Rebelliousness- reckless behavior  · Withdrawal from people/activities they love  · Confusion- inability to concentrate     If you or a loved one observes any of these behaviors or has concerns about self-harm, here's what you can do:  · Talk about it- your feelings and reasons for harming yourself  · Remove any means that you might use to hurt yourself (examples: pills, rope, extension cords, firearm)  · Get professional help from the community (Mental Health, Substance Abuse, psychological counseling)  · Do not be alone:Call your Safe Contact- someone whom you trust who will be there for you.  · Call your local CRISIS HOTLINE 458-3338 or 612-353-9439  · Call your local Children's Mobile Crisis Response Team [place] (217) 119-6149 or www.[employer]tron  · Call the toll free National Suicide Prevention Hotlines   · National Suicide Prevention Lifeline 235-993-JWCX (9145)  · National Hope Line Network 800-SUICIDE (464-2023)

## 2020-03-27 NOTE — PROGRESS NOTES
Telemetry Shift Summary    Rhythm SR/ST  HR Range 80s-120s  Ectopy none  Measurements 0.14/0.08/0.36        Normal Values  Rhythm SR  HR Range    Measurements 0.12-0.20 / 0.06-0.10  / 0.30-0.52

## 2020-03-27 NOTE — CARE PLAN
Problem: Knowledge Deficit  Goal: Knowledge of disease process/condition, treatment plan, diagnostic tests, and medications will improve  Outcome: PROGRESSING AS EXPECTED  Intervention: Assess knowledge level of disease process/condition, treatment plan, diagnostic tests, and medications  Note: Patient's knowledge of plan of care, diagnostics, and medications regularly assessed. RN answers patient questions appropriately, education provided. Patient verbalizes better understanding of their condition.       Problem: Pain Management  Goal: Pain level will decrease to patient's comfort goal  Outcome: PROGRESSING AS EXPECTED  Intervention: Follow pain managment plan developed in collaboration with patient and Interdisciplinary Team  Note: Patient declines intervention for pain. Presents with complaint of soreness/aching. Comfort measures implemented.

## 2020-03-27 NOTE — DISCHARGE SUMMARY
Discharge Summary    CHIEF COMPLAINT ON ADMISSION  Chief Complaint   Patient presents with   • Cough   • N/V       Reason for Admission  EMS     Admission Date  3/26/2020    CODE STATUS  Full Code    HPI & HOSPITAL COURSE  This is a 49 y.o. female with a history of hypothyroidism here with cough, myalgias, shortness of breath and diarrhea.  She was found to have dehydration and an elevated lactic acid level on admission therefore she was admitted and provided IV fluids.  Due to her combination of symptoms, she was placed on isolation to rule out COVID.  The results of this are pending at the time of discharge however she will be notified of the results.  Her lactic acid came down with fluids.  She did develop worsening diarrhea however was able to tolerate oral fluids to maintain her hydration.  She did not require oxygen.  Her x-ray was clear of any consolidation or infiltrate.  She did not have ongoing fever therefore she was determined stable for discharge.  She was given a recommendation to remain in isolation for 14 days.       Therefore, she is discharged in fair and stable condition to home with close outpatient follow-up.    The patient recovered much more quickly than anticipated on admission.    Discharge Date  3/27/2020    FOLLOW UP ITEMS POST DISCHARGE  Stay in isolation for 14 days  Take Imodium as needed for diarrhea  Stay hydrated with water and electrolyte containing fluids  Take Tylenol as needed for fever.    Return to ER if worsening fever, confusion or difficulty breathing.        DISCHARGE DIAGNOSES  Active Problems:    Acquired hypothyroidism POA: Yes    Hyponatremia POA: Unknown    Sepsis (HCC) POA: Unknown    Respiratory distress POA: Unknown  Resolved Problems:    * No resolved hospital problems. *      FOLLOW UP  Follow-up with PCP within 3 weeks    MEDICATIONS ON DISCHARGE     Medication List      START taking these medications      Instructions   guaiFENesin dextromethorphan 100-10 MG/5ML  Syrp syrup  Commonly known as:  ROBITUSSIN DM   Take 10 mL by mouth every 6 hours as needed for Cough.  Dose:  10 mL        CHANGE how you take these medications      Instructions   levothyroxine 75 MCG Tabs  What changed:    · medication strength  · how much to take  · how to take this  · when to take this  Commonly known as:  SYNTHROID   Take 1 Tab by mouth Every morning on an empty stomach.  Dose:  75 mcg        CONTINUE taking these medications      Instructions   benzonatate 100 MG Caps  Commonly known as:  TESSALON   Take 1 Cap by mouth 3 times a day as needed for Cough.  Dose:  100 mg     ferrous sulfate 325 (65 Fe) MG tablet   Take 325 mg by mouth every day.  Dose:  325 mg     liothyronine 5 MCG Tabs  Commonly known as:  CYTOMEL   Take 0.5 Tabs by mouth every day.  Dose:  2.5 mcg     loperamide 2 MG Caps  Commonly known as:  IMODIUM   Take 1 Cap by mouth 4 times a day as needed for Diarrhea.  Dose:  2 mg            Allergies  Allergies   Allergen Reactions   • Anesthesia S-I-40 [Propofol]    • Gluten Meal        DIET  Orders Placed This Encounter   Procedures   • Diet Order Regular     Standing Status:   Standing     Number of Occurrences:   1     Order Specific Question:   Diet:     Answer:   Regular [1]     Order Specific Question:   Miscellaneous modifications:     Answer:   Gluten Free per PT [10]     Order Specific Question:   Miscellaneous modifications:     Answer:   No Caffeine [12]     Order Specific Question:   Miscellaneous modifications:     Answer:   Vegetarian [13]       ACTIVITY  As tolerated.  Weight bearing as tolerated    CONSULTATIONS  Covid consult    PROCEDURES  None    LABORATORY  Lab Results   Component Value Date    SODIUM 129 (L) 03/27/2020    POTASSIUM 3.8 03/27/2020    CHLORIDE 94 (L) 03/27/2020    CO2 21 03/27/2020    GLUCOSE 99 03/27/2020    BUN 13 03/27/2020    CREATININE 0.70 03/27/2020        Lab Results   Component Value Date    WBC 7.6 03/27/2020    HEMOGLOBIN 14.6  03/27/2020    HEMATOCRIT 41.5 03/27/2020    PLATELETCT 191 03/27/2020        Total time of the discharge process exceeds 38 minutes.

## 2020-03-27 NOTE — PROGRESS NOTES
Telemetry Shift Summary     Rhythm SR  HR Range 80s  Ectopy none  Measurements 0.14/0.08/0.36           Normal Values  Rhythm SR  HR Range    Measurements 0.12-0.20 / 0.06-0.10  / 0.30-0.52

## 2020-03-28 LAB
BACTERIA UR CULT: ABNORMAL
BACTERIA UR CULT: ABNORMAL
SARS-COV-2 RNA RESP QL NAA+PROBE: NOT DETECTED
SIGNIFICANT IND 70042: ABNORMAL
SITE SITE: ABNORMAL
SOURCE SOURCE: ABNORMAL
SPECIMEN SOURCE: NORMAL

## 2020-03-29 LAB
BACTERIA SPEC RESP CULT: NORMAL
GRAM STN SPEC: NORMAL
SIGNIFICANT IND 70042: NORMAL
SITE SITE: NORMAL
SOURCE SOURCE: NORMAL

## 2020-03-31 LAB
BACTERIA BLD CULT: NORMAL
BACTERIA BLD CULT: NORMAL
SIGNIFICANT IND 70042: NORMAL
SIGNIFICANT IND 70042: NORMAL
SITE SITE: NORMAL
SITE SITE: NORMAL
SOURCE SOURCE: NORMAL
SOURCE SOURCE: NORMAL

## 2020-04-09 NOTE — DOCUMENTATION QUERY
[facility]                                                                       Query Response Note      PATIENT:               BRANDY LEAHY  ACCT #:                  [id number]  MRN:                     [id number]  :                      1970  ADMIT DATE:       3/26/2020 12:07 PM  DISCH DATE:        3/27/2020 12:01 PM  RESPONDING  PROVIDER #:        [id number]           QUERY TEXT:    Pt has documented sepsis noted as ?rule out? or similar terminology such as suspected, probable, etc.  Please clarify status of this condition:    NOTE:  If an appropriate response is not listed below, please respond with a new note.       The patient's Clinical Indicators include:  Patient admitted with possible sepsis due to pneumonia or covid-19. SIRS criteria on evaluation included fever with temp greater than 101 deg F, and tachycardia with heart rate greater than 90 bpm.  Elevated lactic acid level of 5.5. Patients covid test was negative and there was no chest x-ray evidence of pneumonia. No other source of sepsis was identified.  Options provided:   -- Sepsis is ruled in, (please specify source if known)   -- Sepsis is ruled out   -- Unable to determine      Query created by: Alexandra Wright on 2020 11:49 AM    RESPONSE TEXT:    Sepsis is ruled in, (please specify source if known)          Electronically signed by:  MITCH RUVALCABA DO 2020 12:13 PM

## 2020-04-10 ENCOUNTER — TELEMEDICINE (OUTPATIENT)
Dept: MEDICAL GROUP | Facility: MEDICAL CENTER | Age: 50
End: 2020-04-10
Payer: COMMERCIAL

## 2020-04-10 VITALS — RESPIRATION RATE: 14 BRPM | TEMPERATURE: 97 F

## 2020-04-10 DIAGNOSIS — A41.9 SEPSIS, DUE TO UNSPECIFIED ORGANISM, UNSPECIFIED WHETHER ACUTE ORGAN DYSFUNCTION PRESENT (HCC): ICD-10-CM

## 2020-04-10 DIAGNOSIS — R06.03 RESPIRATORY DISTRESS: ICD-10-CM

## 2020-04-10 DIAGNOSIS — E87.1 HYPONATREMIA: ICD-10-CM

## 2020-04-10 PROCEDURE — 99214 OFFICE O/P EST MOD 30 MIN: CPT | Mod: 95,CR | Performed by: INTERNAL MEDICINE

## 2020-04-10 NOTE — PROGRESS NOTES
This encounter was conducted via Zoom meeting  Verbal consent was obtained. Patient's identity was verified.       CC: Hospital follow-up sepsis.                                                                                                                                   HPI:   Rafaela presents today with the following.    1. Hyponatremia/ Sepsis, due to unspecified organism, unspecified whether acute organ dysfunction present (HCC)/. Respiratory distress  Presents after going to the hospital with severe weakness shakes and respiratory distress.  She was found to have an elevated lactic acid was hydrated aggressively and discharged the next day.  All viral testing including influenza and Covid-19 were negative.  She reports she is finally able to get out of bed and feeling more energy.  Cough is still present but nonproductive.  Overall she is feeling well she is self isolating denying any other sick contacts.   has not developed any symptoms.      Patient Active Problem List    Diagnosis Date Noted   • Iron deficiency anemia 04/13/2018   • Acquired hypothyroidism 03/02/2018   • History of thyroid cancer 03/02/2018   • Primary insomnia 03/02/2018       Current Outpatient Medications   Medication Sig Dispense Refill   • levothyroxine (SYNTHROID) 75 MCG Tab Take 1 Tab by mouth Every morning on an empty stomach. 30 Tab 1   • guaiFENesin dextromethorphan (ROBITUSSIN DM) 100-10 MG/5ML Syrup syrup Take 10 mL by mouth every 6 hours as needed for Cough. 840 mL 1   • loperamide (IMODIUM) 2 MG Cap Take 1 Cap by mouth 4 times a day as needed for Diarrhea. 30 Cap 1   • ferrous sulfate 325 (65 Fe) MG tablet Take 325 mg by mouth every day.     • liothyronine (CYTOMEL) 5 MCG Tab Take 0.5 Tabs by mouth every day. 30 Tab 111     No current facility-administered medications for this visit.          Allergies as of 04/10/2020 - Reviewed 04/10/2020   Allergen Reaction Noted   • Anesthesia s-i-40 [propofol]  03/02/2018   •  Gluten meal  03/27/2020        ROS:  Denies, chest pain, Shortness of breath, Edema.     Temp 36.1 °C (97 °F)   Resp 14   LMP 03/12/2020 (Within Weeks)       Physical Exam:  Constitutional: Alert, no distress, well-groomed.  Skin: No rashes in visible areas.  Eye: Round. Conjunctiva clear, lNo icterus.   ENMT: Lips pink without lesions, good dentition, moist mucous membranes. Phonation normal.  Neck: No masses, no thyromegaly. Moves freely without pain.  CV: Pulse as reported by patient  Respiratory: Unlabored respiratory effort, no cough or audible wheeze  Psych: Alert and oriented x3, normal affect and mood.          Assessment and Plan.   49 y.o. female with the following issues.    1. Hyponatremia/ Sepsis, due to unspecified organism, unspecified whether acute organ dysfunction present (HCC)/. Respiratory distress  No definitive etiology at this point suspect either a negative influenza or Covid19 test as the most likely cause.  She is clinically improving have sent for repeat sodium and CBC in the coming weeks.  She will continue to self isolating and get blood work done only after her symptoms are resolved.  Recheck thyroid in 2 months.  Will be interested if antibody testing ever becomes available to check these numbers.  - Basic Metabolic Panel; Future  - CBC WITH DIFFERENTIAL; Future

## 2020-04-17 ENCOUNTER — HOSPITAL ENCOUNTER (OUTPATIENT)
Dept: LAB | Facility: MEDICAL CENTER | Age: 50
End: 2020-04-17
Attending: INTERNAL MEDICINE
Payer: COMMERCIAL

## 2020-04-17 DIAGNOSIS — E87.1 HYPONATREMIA: ICD-10-CM

## 2020-04-17 LAB
ANION GAP SERPL CALC-SCNC: 10 MMOL/L (ref 7–16)
BASOPHILS # BLD AUTO: 0.7 % (ref 0–1.8)
BASOPHILS # BLD: 0.04 K/UL (ref 0–0.12)
BUN SERPL-MCNC: 10 MG/DL (ref 8–22)
CALCIUM SERPL-MCNC: 9.1 MG/DL (ref 8.4–10.2)
CHLORIDE SERPL-SCNC: 103 MMOL/L (ref 96–112)
CO2 SERPL-SCNC: 24 MMOL/L (ref 20–33)
CREAT SERPL-MCNC: 0.77 MG/DL (ref 0.5–1.4)
EOSINOPHIL # BLD AUTO: 0.22 K/UL (ref 0–0.51)
EOSINOPHIL NFR BLD: 4.1 % (ref 0–6.9)
ERYTHROCYTE [DISTWIDTH] IN BLOOD BY AUTOMATED COUNT: 45.9 FL (ref 35.9–50)
FASTING STATUS PATIENT QL REPORTED: NORMAL
GLUCOSE SERPL-MCNC: 96 MG/DL (ref 65–99)
HCT VFR BLD AUTO: 40.6 % (ref 37–47)
HGB BLD-MCNC: 13.5 G/DL (ref 12–16)
IMM GRANULOCYTES # BLD AUTO: 0.02 K/UL (ref 0–0.11)
IMM GRANULOCYTES NFR BLD AUTO: 0.4 % (ref 0–0.9)
LYMPHOCYTES # BLD AUTO: 1.5 K/UL (ref 1–4.8)
LYMPHOCYTES NFR BLD: 27.8 % (ref 22–41)
MCH RBC QN AUTO: 31.1 PG (ref 27–33)
MCHC RBC AUTO-ENTMCNC: 33.3 G/DL (ref 33.6–35)
MCV RBC AUTO: 93.5 FL (ref 81.4–97.8)
MONOCYTES # BLD AUTO: 0.5 K/UL (ref 0–0.85)
MONOCYTES NFR BLD AUTO: 9.3 % (ref 0–13.4)
NEUTROPHILS # BLD AUTO: 3.11 K/UL (ref 2–7.15)
NEUTROPHILS NFR BLD: 57.7 % (ref 44–72)
NRBC # BLD AUTO: 0 K/UL
NRBC BLD-RTO: 0 /100 WBC
PLATELET # BLD AUTO: 208 K/UL (ref 164–446)
PMV BLD AUTO: 9.2 FL (ref 9–12.9)
POTASSIUM SERPL-SCNC: 3.8 MMOL/L (ref 3.6–5.5)
RBC # BLD AUTO: 4.34 M/UL (ref 4.2–5.4)
SODIUM SERPL-SCNC: 137 MMOL/L (ref 135–145)
WBC # BLD AUTO: 5.4 K/UL (ref 4.8–10.8)

## 2020-04-17 PROCEDURE — 85025 COMPLETE CBC W/AUTO DIFF WBC: CPT

## 2020-04-17 PROCEDURE — 80048 BASIC METABOLIC PNL TOTAL CA: CPT

## 2020-04-17 PROCEDURE — 36415 COLL VENOUS BLD VENIPUNCTURE: CPT

## 2020-06-05 DIAGNOSIS — E03.9 ACQUIRED HYPOTHYROIDISM: ICD-10-CM

## 2020-06-05 RX ORDER — LIOTHYRONINE SODIUM 5 UG/1
2.5 TABLET ORAL DAILY
Qty: 90 TAB | Refills: 0 | Status: SHIPPED | OUTPATIENT
Start: 2020-06-05 | End: 2020-06-08 | Stop reason: SDUPTHER

## 2020-06-05 RX ORDER — LEVOTHYROXINE SODIUM 0.07 MG/1
75 TABLET ORAL
Qty: 90 TAB | Refills: 0 | Status: SHIPPED | OUTPATIENT
Start: 2020-06-05 | End: 2020-06-08 | Stop reason: SDUPTHER

## 2020-06-08 DIAGNOSIS — E03.9 ACQUIRED HYPOTHYROIDISM: ICD-10-CM

## 2020-06-08 RX ORDER — LIOTHYRONINE SODIUM 5 UG/1
2.5 TABLET ORAL DAILY
Qty: 90 TAB | Refills: 3 | Status: SHIPPED | OUTPATIENT
Start: 2020-06-08 | End: 2020-09-16 | Stop reason: SDUPTHER

## 2020-06-08 RX ORDER — LEVOTHYROXINE SODIUM 0.07 MG/1
75 TABLET ORAL
Qty: 90 TAB | Refills: 3 | Status: SHIPPED | OUTPATIENT
Start: 2020-06-08 | End: 2020-09-16 | Stop reason: SDUPTHER

## 2020-07-09 ENCOUNTER — HOSPITAL ENCOUNTER (OUTPATIENT)
Dept: RADIOLOGY | Facility: MEDICAL CENTER | Age: 50
End: 2020-07-09
Attending: OBSTETRICS & GYNECOLOGY
Payer: COMMERCIAL

## 2020-07-09 DIAGNOSIS — R92.30 BREAST DENSITY: ICD-10-CM

## 2020-07-09 DIAGNOSIS — Z12.31 ENCOUNTER FOR SCREENING MAMMOGRAM FOR HIGH-RISK PATIENT: ICD-10-CM

## 2020-07-09 PROCEDURE — 77067 SCR MAMMO BI INCL CAD: CPT

## 2020-09-16 ENCOUNTER — OFFICE VISIT (OUTPATIENT)
Dept: MEDICAL GROUP | Facility: MEDICAL CENTER | Age: 50
End: 2020-09-16
Payer: COMMERCIAL

## 2020-09-16 VITALS
HEART RATE: 62 BPM | DIASTOLIC BLOOD PRESSURE: 56 MMHG | WEIGHT: 141 LBS | SYSTOLIC BLOOD PRESSURE: 98 MMHG | TEMPERATURE: 97.5 F | OXYGEN SATURATION: 98 % | BODY MASS INDEX: 22.13 KG/M2 | HEIGHT: 67 IN

## 2020-09-16 DIAGNOSIS — Z13.6 SCREENING FOR CARDIOVASCULAR CONDITION: ICD-10-CM

## 2020-09-16 DIAGNOSIS — D50.9 IRON DEFICIENCY ANEMIA, UNSPECIFIED IRON DEFICIENCY ANEMIA TYPE: ICD-10-CM

## 2020-09-16 DIAGNOSIS — Z20.822 EXPOSURE TO COVID-19 VIRUS: ICD-10-CM

## 2020-09-16 DIAGNOSIS — E55.9 VITAMIN D DEFICIENCY: ICD-10-CM

## 2020-09-16 DIAGNOSIS — Z85.850 HISTORY OF THYROID CANCER: ICD-10-CM

## 2020-09-16 DIAGNOSIS — M79.672 LEFT FOOT PAIN: ICD-10-CM

## 2020-09-16 DIAGNOSIS — E03.9 ACQUIRED HYPOTHYROIDISM: ICD-10-CM

## 2020-09-16 DIAGNOSIS — Z12.83 SCREENING FOR SKIN CANCER: ICD-10-CM

## 2020-09-16 PROCEDURE — 99214 OFFICE O/P EST MOD 30 MIN: CPT | Performed by: INTERNAL MEDICINE

## 2020-09-16 RX ORDER — LIOTHYRONINE SODIUM 5 UG/1
2.5 TABLET ORAL DAILY
Qty: 90 TAB | Refills: 3 | Status: SHIPPED | OUTPATIENT
Start: 2020-09-16 | End: 2021-10-22 | Stop reason: SDUPTHER

## 2020-09-16 RX ORDER — LEVOTHYROXINE SODIUM 0.07 MG/1
75 TABLET ORAL
Qty: 90 TAB | Refills: 3 | Status: SHIPPED | OUTPATIENT
Start: 2020-09-16 | End: 2021-10-21 | Stop reason: SDUPTHER

## 2020-09-16 ASSESSMENT — PATIENT HEALTH QUESTIONNAIRE - PHQ9: CLINICAL INTERPRETATION OF PHQ2 SCORE: 0

## 2020-09-16 ASSESSMENT — FIBROSIS 4 INDEX: FIB4 SCORE: 1.29

## 2020-09-16 NOTE — PROGRESS NOTES
CC: Follow-up thyroid, history of iron deficiency complaint foot pain questionable viral illness earlier in the year.                                                                                                                                      HPI:   Rafaela presents today with the following.    1. History of thyroid cancer  History of thyroid cancer status post removal she was over suppressed appropriately on last check.    2. Acquired hypothyroidism  18 on medications requesting refills.    3. Iron deficiency anemia, unspecified iron deficiency anemia type  3 of iron deficiency anemia has been normal since been on a ferritin drawn recently.    4. Left foot pain  Assistant left foot pain in the area of possible neuroma she did receive injections but is still problematic would like to see neurology.    5. Exposure to COVID-19 virus  Earlier in the year she had a septic type events critically progressing was tested for COVID but negative.  No other etiology was found at the time of certainly persistent suspicion.          Patient Active Problem List    Diagnosis Date Noted   • Iron deficiency anemia 04/13/2018   • Acquired hypothyroidism 03/02/2018   • History of thyroid cancer 03/02/2018   • Primary insomnia 03/02/2018       Current Outpatient Medications   Medication Sig Dispense Refill   • levothyroxine (SYNTHROID) 75 MCG Tab Take 1 Tab by mouth Every morning on an empty stomach. 90 Tab 3   • liothyronine (CYTOMEL) 5 MCG Tab Take 0.5 Tabs by mouth every day. 90 Tab 3   • ferrous sulfate 325 (65 Fe) MG tablet Take 325 mg by mouth every day.       No current facility-administered medications for this visit.          Allergies as of 09/16/2020 - Reviewed 09/16/2020   Allergen Reaction Noted   • Anesthesia s-i-40 [propofol]  03/02/2018   • Gluten meal  03/27/2020        ROS: Denies Chest pain, SOB, LE edema.    BP (!) 98/56 (BP Location: Left arm, Patient Position: Sitting)   Pulse 62   Temp 36.4 °C  "(97.5 °F)   Ht 1.709 m (5' 7.3\")   Wt 64 kg (141 lb)   SpO2 98%   BMI 21.89 kg/m²     Physical Exam:  Gen:         Alert and oriented, No apparent distress.  Neck:        No Lymphadenopathy or Bruits.  Lungs:     Clear to auscultation bilaterally  CV:          Regular rate and rhythm. No murmurs, rubs or gallops.               Ext:          No clubbing, cyanosis, edema.      Assessment and Plan.   49 y.o. female with the following issues.    1. History of thyroid cancer  Recheck TSH  - TSH; Future    2. Acquired hypothyroidism  Fill medication  - levothyroxine (SYNTHROID) 75 MCG Tab; Take 1 Tab by mouth Every morning on an empty stomach.  Dispense: 90 Tab; Refill: 3  - liothyronine (CYTOMEL) 5 MCG Tab; Take 0.5 Tabs by mouth every day.  Dispense: 90 Tab; Refill: 3    3. Iron deficiency anemia, unspecified iron deficiency anemia type  Filled iron levels  - CBC WITH DIFFERENTIAL; Future  - FERRITIN; Future    4. Left foot pain  And seems to be consistent with neuroma discussed foot wear referral to podiatry  - REFERRAL TO PODIATRY    5. Exposure to COVID-19 virus  Need for antibody testing  - SARS CoV-2 Ab, Total; Future    6. Screening for cardiovascular condition    - Comp Metabolic Panel; Future  - Lipid Profile; Future    7. Screening for skin cancer    - REFERRAL TO DERMATOLOGY    8. Vitamin D deficiency    - VITAMIN D,25 HYDROXY; Future      "

## 2020-09-23 ENCOUNTER — HOSPITAL ENCOUNTER (OUTPATIENT)
Dept: LAB | Facility: MEDICAL CENTER | Age: 50
End: 2020-09-23
Attending: INTERNAL MEDICINE
Payer: COMMERCIAL

## 2020-09-23 DIAGNOSIS — E55.9 VITAMIN D DEFICIENCY: ICD-10-CM

## 2020-09-23 DIAGNOSIS — Z20.822 EXPOSURE TO COVID-19 VIRUS: ICD-10-CM

## 2020-09-23 DIAGNOSIS — Z13.6 SCREENING FOR CARDIOVASCULAR CONDITION: ICD-10-CM

## 2020-09-23 DIAGNOSIS — Z85.850 HISTORY OF THYROID CANCER: ICD-10-CM

## 2020-09-23 DIAGNOSIS — D50.9 IRON DEFICIENCY ANEMIA, UNSPECIFIED IRON DEFICIENCY ANEMIA TYPE: ICD-10-CM

## 2020-09-23 LAB
25(OH)D3 SERPL-MCNC: 53 NG/ML (ref 30–100)
ALBUMIN SERPL BCP-MCNC: 4.3 G/DL (ref 3.2–4.9)
ALBUMIN/GLOB SERPL: 1.4 G/DL
ALP SERPL-CCNC: 33 U/L (ref 30–99)
ALT SERPL-CCNC: 16 U/L (ref 2–50)
ANION GAP SERPL CALC-SCNC: 10 MMOL/L (ref 7–16)
AST SERPL-CCNC: 19 U/L (ref 12–45)
BASOPHILS # BLD AUTO: 1 % (ref 0–1.8)
BASOPHILS # BLD: 0.05 K/UL (ref 0–0.12)
BILIRUB SERPL-MCNC: 0.9 MG/DL (ref 0.1–1.5)
BUN SERPL-MCNC: 11 MG/DL (ref 8–22)
CALCIUM SERPL-MCNC: 9.3 MG/DL (ref 8.4–10.2)
CHLORIDE SERPL-SCNC: 102 MMOL/L (ref 96–112)
CHOLEST SERPL-MCNC: 152 MG/DL (ref 100–199)
CO2 SERPL-SCNC: 27 MMOL/L (ref 20–33)
CREAT SERPL-MCNC: 0.77 MG/DL (ref 0.5–1.4)
EOSINOPHIL # BLD AUTO: 0.17 K/UL (ref 0–0.51)
EOSINOPHIL NFR BLD: 3.5 % (ref 0–6.9)
ERYTHROCYTE [DISTWIDTH] IN BLOOD BY AUTOMATED COUNT: 46.9 FL (ref 35.9–50)
FASTING STATUS PATIENT QL REPORTED: NORMAL
FERRITIN SERPL-MCNC: 30.7 NG/ML (ref 10–291)
GLOBULIN SER CALC-MCNC: 3.1 G/DL (ref 1.9–3.5)
GLUCOSE SERPL-MCNC: 99 MG/DL (ref 65–99)
HCT VFR BLD AUTO: 42.6 % (ref 37–47)
HDLC SERPL-MCNC: 88 MG/DL
HGB BLD-MCNC: 14.3 G/DL (ref 12–16)
IMM GRANULOCYTES # BLD AUTO: 0.01 K/UL (ref 0–0.11)
IMM GRANULOCYTES NFR BLD AUTO: 0.2 % (ref 0–0.9)
LDLC SERPL CALC-MCNC: 54 MG/DL
LYMPHOCYTES # BLD AUTO: 1.25 K/UL (ref 1–4.8)
LYMPHOCYTES NFR BLD: 26.1 % (ref 22–41)
MCH RBC QN AUTO: 31.8 PG (ref 27–33)
MCHC RBC AUTO-ENTMCNC: 33.6 G/DL (ref 33.6–35)
MCV RBC AUTO: 94.9 FL (ref 81.4–97.8)
MONOCYTES # BLD AUTO: 0.42 K/UL (ref 0–0.85)
MONOCYTES NFR BLD AUTO: 8.8 % (ref 0–13.4)
NEUTROPHILS # BLD AUTO: 2.89 K/UL (ref 2–7.15)
NEUTROPHILS NFR BLD: 60.4 % (ref 44–72)
NRBC # BLD AUTO: 0 K/UL
NRBC BLD-RTO: 0 /100 WBC
PLATELET # BLD AUTO: 216 K/UL (ref 164–446)
PMV BLD AUTO: 8.8 FL (ref 9–12.9)
POTASSIUM SERPL-SCNC: 3.8 MMOL/L (ref 3.6–5.5)
PROT SERPL-MCNC: 7.4 G/DL (ref 6–8.2)
RBC # BLD AUTO: 4.49 M/UL (ref 4.2–5.4)
SODIUM SERPL-SCNC: 139 MMOL/L (ref 135–145)
TRIGL SERPL-MCNC: 52 MG/DL (ref 0–149)
TSH SERPL DL<=0.005 MIU/L-ACNC: 0.11 UIU/ML (ref 0.38–5.33)
WBC # BLD AUTO: 4.8 K/UL (ref 4.8–10.8)

## 2020-09-23 PROCEDURE — 82728 ASSAY OF FERRITIN: CPT

## 2020-09-23 PROCEDURE — 82306 VITAMIN D 25 HYDROXY: CPT

## 2020-09-23 PROCEDURE — 86769 SARS-COV-2 COVID-19 ANTIBODY: CPT

## 2020-09-23 PROCEDURE — 36415 COLL VENOUS BLD VENIPUNCTURE: CPT

## 2020-09-23 PROCEDURE — 80061 LIPID PANEL: CPT

## 2020-09-23 PROCEDURE — 85025 COMPLETE CBC W/AUTO DIFF WBC: CPT

## 2020-09-23 PROCEDURE — 84443 ASSAY THYROID STIM HORMONE: CPT

## 2020-09-23 PROCEDURE — 80053 COMPREHEN METABOLIC PANEL: CPT

## 2020-09-24 LAB — SARS-COV-2 AB SERPL QL IA: NORMAL

## 2020-11-12 ENCOUNTER — HOSPITAL ENCOUNTER (OUTPATIENT)
Dept: RADIOLOGY | Facility: MEDICAL CENTER | Age: 50
End: 2020-11-12
Attending: FAMILY MEDICINE
Payer: COMMERCIAL

## 2020-11-12 ENCOUNTER — OFFICE VISIT (OUTPATIENT)
Dept: MEDICAL GROUP | Facility: MEDICAL CENTER | Age: 50
End: 2020-11-12
Payer: COMMERCIAL

## 2020-11-12 VITALS
OXYGEN SATURATION: 97 % | BODY MASS INDEX: 23.07 KG/M2 | DIASTOLIC BLOOD PRESSURE: 68 MMHG | TEMPERATURE: 98 F | RESPIRATION RATE: 16 BRPM | WEIGHT: 147 LBS | SYSTOLIC BLOOD PRESSURE: 104 MMHG | HEIGHT: 67 IN | HEART RATE: 64 BPM

## 2020-11-12 DIAGNOSIS — M79.602 PAIN IN BOTH UPPER EXTREMITIES: ICD-10-CM

## 2020-11-12 DIAGNOSIS — M50.30 DISC DISEASE, DEGENERATIVE, CERVICAL: ICD-10-CM

## 2020-11-12 DIAGNOSIS — N95.1 MENOPAUSAL SYMPTOMS: ICD-10-CM

## 2020-11-12 DIAGNOSIS — M79.601 PAIN IN BOTH UPPER EXTREMITIES: ICD-10-CM

## 2020-11-12 DIAGNOSIS — Z85.850 HISTORY OF THYROID CANCER: ICD-10-CM

## 2020-11-12 DIAGNOSIS — R29.898 WEAKNESS OF BOTH ARMS: ICD-10-CM

## 2020-11-12 DIAGNOSIS — R20.2 PARESTHESIAS: ICD-10-CM

## 2020-11-12 DIAGNOSIS — E03.9 ACQUIRED HYPOTHYROIDISM: ICD-10-CM

## 2020-11-12 DIAGNOSIS — D50.9 IRON DEFICIENCY ANEMIA, UNSPECIFIED IRON DEFICIENCY ANEMIA TYPE: ICD-10-CM

## 2020-11-12 PROCEDURE — 72040 X-RAY EXAM NECK SPINE 2-3 VW: CPT

## 2020-11-12 PROCEDURE — 99214 OFFICE O/P EST MOD 30 MIN: CPT | Performed by: FAMILY MEDICINE

## 2020-11-12 ASSESSMENT — FIBROSIS 4 INDEX: FIB4 SCORE: 1.14

## 2020-11-12 NOTE — PROGRESS NOTES
"cc: Arm pain    Subjective:     Rafaela Rosa is a 50 y.o. female presenting to establish care:    1.  Arm pain: Has been having bilateral arm pain for the past 2 months.  Describes the sharp, shooting pain from her shoulders that radiates down to her hand.  Was mostly on the right, but does have symptoms on the left.  She otherwise has a constant, dull achiness in her arms.  She also has numbness and tingling in her entire hands.  She also feels weak in her arms.  She reports being diagnosed with carpal tunnel in the past, had nerve testing done, but never went through with surgery.  She exercises regularly.  She has tried over-the-counter natural products with minimal improvement.    2.  Hypothyroidism: Has a history of hypothyroidism for years, has been stable.  Has a history of thyroid cancer status post thyroidectomy, radioactive iodine.  She states that she no longer sees endocrinology.  Is currently on levothyroxine 75 mcg daily, liothyronine 2.5 mg daily.  Her last TSH was 0.12 months ago.  She has been having hot flashes for the past 2 months.  Also has noted poor sleep, vaginal dryness, skin dryness, foggy memory at times.  Her periods have been irregular, her last period was in August.      Review of systems:  See above.  History of iron deficiency anemia, takes iron.      Current Outpatient Medications:   •  levothyroxine (SYNTHROID) 75 MCG Tab, Take 1 Tab by mouth Every morning on an empty stomach., Disp: 90 Tab, Rfl: 3  •  liothyronine (CYTOMEL) 5 MCG Tab, Take 0.5 Tabs by mouth every day., Disp: 90 Tab, Rfl: 3  •  ferrous sulfate 325 (65 Fe) MG tablet, Take 325 mg by mouth every day., Disp: , Rfl:     Allergies, past medical history, past surgical history, family history, social history reviewed and updated    Objective:     Vitals: /68   Pulse 64   Temp 36.7 °C (98 °F)   Resp 16   Ht 1.709 m (5' 7.3\")   Wt 66.7 kg (147 lb)   SpO2 97%   BMI 22.82 kg/m²   General: Alert, " pleasant, NAD  HEENT: Normocephalic.  EOMI, no icterus or pallor.  Conjunctivae and lids normal.  Heart: Regular rate and rhythm.  S1 and S2 normal.  No murmurs appreciated.  Respiratory: Normal respiratory effort.  Clear to auscultation bilaterally.  Abdomen: Non-distended, soft  Skin: Warm, dry, no rashes.  Musculoskeletal: Gait is normal.  Moves all extremities well.  No cervical spinal tenderness.  Shoulder range of motion is normal.  Questionable positive Spurling's.  Slightly decreased , supination, pronation strength, left greater than right.  Bicep reflexes 1+ symmetric.  Extremities: No leg edema.  Psych:  Affect/mood is normal, judgement is good, memory is intact, grooming is appropriate.    Assessment/Plan:     Rafaela was seen today for establish care.    Diagnoses and all orders for this visit:    Pain in both upper extremities  Weakness of both arms  Paresthesias  Disc disease, degenerative, cervical  New problem.  X-ray showing degenerative disc disease, anterolisthesis.  Referral to neurosurgery placed as she does seem to have radicular symptoms.  -     DX-CERVICAL SPINE-2 OR 3 VIEWS; Future  -     REFERRAL TO NEUROSURGERY    History of thyroid cancer  Acquired hypothyroidism  New problem, stable.  We will continue to keep her TSH less than 0.5.    Menopausal symptoms  New problem, we discussed her symptoms in detail.  She is not interested in any medications at this time.  Follow-up in 6 months    Iron deficiency anemia, unspecified iron deficiency anemia type  New problem, stable, continue iron supplement      Return in about 6 months (around 5/12/2021) for routine follow up.

## 2020-12-10 ENCOUNTER — PATIENT MESSAGE (OUTPATIENT)
Dept: MEDICAL GROUP | Facility: MEDICAL CENTER | Age: 50
End: 2020-12-10

## 2020-12-10 DIAGNOSIS — M79.601 PAIN IN BOTH UPPER EXTREMITIES: ICD-10-CM

## 2020-12-10 DIAGNOSIS — M50.30 DISC DISEASE, DEGENERATIVE, CERVICAL: ICD-10-CM

## 2020-12-10 DIAGNOSIS — M79.602 PAIN IN BOTH UPPER EXTREMITIES: ICD-10-CM

## 2020-12-10 DIAGNOSIS — R20.2 PARESTHESIAS: ICD-10-CM

## 2020-12-10 DIAGNOSIS — R29.898 WEAKNESS OF BOTH ARMS: ICD-10-CM

## 2021-01-08 ENCOUNTER — APPOINTMENT (OUTPATIENT)
Dept: RADIOLOGY | Facility: MEDICAL CENTER | Age: 51
End: 2021-01-08
Attending: NEUROLOGICAL SURGERY
Payer: COMMERCIAL

## 2021-01-08 DIAGNOSIS — M54.2 CERVICALGIA: ICD-10-CM

## 2021-01-08 PROCEDURE — 72141 MRI NECK SPINE W/O DYE: CPT

## 2021-01-12 ENCOUNTER — APPOINTMENT (OUTPATIENT)
Dept: RADIOLOGY | Facility: MEDICAL CENTER | Age: 51
End: 2021-01-12
Attending: OBSTETRICS & GYNECOLOGY
Payer: COMMERCIAL

## 2021-02-17 ENCOUNTER — APPOINTMENT (OUTPATIENT)
Dept: RADIOLOGY | Facility: MEDICAL CENTER | Age: 51
End: 2021-02-17
Attending: OBSTETRICS & GYNECOLOGY
Payer: COMMERCIAL

## 2021-02-23 NOTE — PROGRESS NOTES
New patient note    Physiatry (physical medicine and  Rehabilitation), interventional spine and sports medicine    Date of service: See epic    Chief complaint:   Chief Complaint   Patient presents with   • New Patient     Neck pain        Referring provider: Kosta Rodriguez M.D.     HISTORY    HPI: Rafaela Rosa 50 y.o.  who presents today with Diagnoses of Neuralgia, Cervical radiculopathy, DDD (degenerative disc disease), cervical, Cervical spondylosis, and Bilateral carpal tunnel syndrome were pertinent to this visit.    HPI    Acute on chronic bilateral neck pain radiating down the bilateral arms with numbness and tingling in the bilateral hands mostly in the palmar aspect mostly in the first 3 digits towards the fingertips worst with hand and wrist movements, worse with neck movements.  This pain is 4-7/10 in intensity typically worse at night.  This can wake her up at night.  She has tried wearing neutral wrist splints but had not tolerated these well and did not have a significant improvement in her pain.  She did not try wearing her wrist splints overnight.  The patient has tried physical therapy    She is also tried NSAIDs, muscle relaxers, Tylenol in the past       Medical records review:  I reviewed the note from the referring provider Kosta Rodriguez M.D. including the note dated 2/5/2021.  Concern for cervical radiculopathy, carpal tunnel syndrome or both.  Referred to physiatry for further evaluation and potential diagnostic procedures.          ROS:   Red Flags ROS:   Fever, Chills, Sweats: Denies  Involuntary Weight Loss: Denies  Bladder Incontinence: Denies  Bowel Incontinence: denies  Saddle Anesthesia: Denies    All other systems reviewed and negative.       PMHx:   Past Medical History:   Diagnosis Date   • Anemia    • Thyroid disease          Current Outpatient Medications on File Prior to Visit   Medication Sig Dispense Refill   • Epinastine HCl 0.05 % Solution Administer  into  affected eye(s).     • Loteprednol Etabonate (EYSUVIS) 0.25 % Suspension Administer  into affected eye(s).     • Naproxen Sodium (ALEVE PO) Take  by mouth.     • levothyroxine (SYNTHROID) 75 MCG Tab Take 1 Tab by mouth Every morning on an empty stomach. 90 Tab 3   • liothyronine (CYTOMEL) 5 MCG Tab Take 0.5 Tabs by mouth every day. 90 Tab 3   • ferrous sulfate 325 (65 Fe) MG tablet Take 325 mg by mouth every day.       No current facility-administered medications on file prior to visit.        PSHx:   Past Surgical History:   Procedure Laterality Date   • LUMPECTOMY     • THYROIDECTOMY         Family history   Family History   Problem Relation Age of Onset   • Heart Disease Mother    • Cancer Mother    • Cancer Father    • Alcohol/Drug Brother          Medications: reviewed on epic.   Outpatient Medications Marked as Taking for the 2/24/21 encounter (Office Visit) with Tato Kyle M.D.   Medication Sig Dispense Refill   • Epinastine HCl 0.05 % Solution Administer  into affected eye(s).     • Loteprednol Etabonate (EYSUVIS) 0.25 % Suspension Administer  into affected eye(s).     • Naproxen Sodium (ALEVE PO) Take  by mouth.     • diclofenac sodium 1 % Gel Place 3 g on the skin 4 times a day as needed (pain). 100 g 3   • gabapentin (NEURONTIN) 100 MG Cap Take 1-3 Capsules by mouth at bedtime as needed (pain). 90 capsule 3   • levothyroxine (SYNTHROID) 75 MCG Tab Take 1 Tab by mouth Every morning on an empty stomach. 90 Tab 3   • liothyronine (CYTOMEL) 5 MCG Tab Take 0.5 Tabs by mouth every day. 90 Tab 3   • ferrous sulfate 325 (65 Fe) MG tablet Take 325 mg by mouth every day.          Allergies:   Allergies   Allergen Reactions   • Anesthesia S-I-40 [Propofol]    • Boost High Protein  [ ] Diarrhea, Nausea and Vomiting   • Gluten Meal        Social Hx:   Social History     Socioeconomic History   • Marital status:      Spouse name: Not on file   • Number of children: Not on file   • Years of education: Not  "on file   • Highest education level: Not on file   Occupational History   • Not on file   Tobacco Use   • Smoking status: Never Smoker   • Smokeless tobacco: Never Used   Substance and Sexual Activity   • Alcohol use: Yes     Comment: 1-2 glasses of wine/weekly   • Drug use: Not on file   • Sexual activity: Not on file   Other Topics Concern   •  Service No   • Blood Transfusions No   • Caffeine Concern No   • Occupational Exposure No   • Hobby Hazards No   • Sleep Concern Yes   • Stress Concern Yes   • Weight Concern No   • Special Diet Yes   • Back Care No   • Exercise Yes   • Bike Helmet No   • Seat Belt Yes   • Self-Exams Yes   Social History Narrative   • Not on file     Social Determinants of Health     Financial Resource Strain:    • Difficulty of Paying Living Expenses:    Food Insecurity:    • Worried About Running Out of Food in the Last Year:    • Ran Out of Food in the Last Year:    Transportation Needs:    • Lack of Transportation (Medical):    • Lack of Transportation (Non-Medical):    Physical Activity:    • Days of Exercise per Week:    • Minutes of Exercise per Session:    Stress:    • Feeling of Stress :    Social Connections:    • Frequency of Communication with Friends and Family:    • Frequency of Social Gatherings with Friends and Family:    • Attends [employer] Services:    • Active Member of Clubs or Organizations:    • Attends Club or Organization Meetings:    • Marital Status:    Intimate Partner Violence:    • Fear of Current or Ex-Partner:    • Emotionally Abused:    • Physically Abused:    • Sexually Abused:          EXAMINATION     Physical Exam:   Vitals: /80 (BP Location: Right arm, Patient Position: Sitting, BP Cuff Size: Adult)   Pulse 68   Temp 36.3 °C (97.4 °F) (Temporal)   Ht 1.715 m (5' 7.5\")   Wt 67 kg (147 lb 11.3 oz)   SpO2 97%     Constitutional:   Body Habitus: Body mass index is 22.79 kg/m².  Cooperation: Fully cooperates with exam  Appearance: " Well-groomed, well-nourished, not disheveled     Eyes: No scleral icterus to suggest severe liver disease, no proptosis to suggest severe hyperthyroid    ENT -no obvious auditory deficits, no obvious tongue lesions, tongue midline, no facial droop     Skin -no rashes or lesions noted     Respiratory-  breathing comfortable on room air, no audible wheezing    Cardiovascular- capillary refills less than 2 seconds. No lower extremity edema is noted.     Gastrointestinal - no obvious abdominal masses, No tenderness to palpation in the abdomen    Psychiatric- alert and oriented ×3. Normal affect.     Gait - normal gait, no use of ambulatory device, nonantalgic.     Musculoskeletal and Neuro -     Bilateral hands:   Inspection: No swelling,  Deformities or rashes. Symmetric appearing thenar and hyperthenar regions bilaterally.  Palpation no significant tenderness to palpation throughout the bilateral hands  Range of motion is within normal limits throughout bilateral hands, fingers and wrist.  Special tests:  Tinel's at the wrist over the median nerve positive bilaterally  Carpal tunnel compression: positive bilaterally  Phalen's test: positive bilaterally  Finkelstein's test: negative bilaterally  CMC grind test negative bilaterally      Cervical spine   Inspection: No deformities of the skin over the cervical spine. No rashes or lesions.    decreased  active range of motion in all directions, with  pain      Spurling’s sign: positive bilaterally    No signs of muscular atrophy in bilateral upper extremities     No tenderness to palpation of the cervical spine          Key points for the international standards for neurological classification of spinal cord injury (ISNCSCI) to light touch.     Dermatome R L   C4 2 2   C5 2 2   C6 2 2   C7 2 2   C8 2 2   T1 2 2   T2 2 2                                     Motor Exam Upper Extremities   ? Myotome R L   Shoulder flexion C5 5 5   Elbow flexion C5 5 5   Wrist extension C6 5 5    Elbow extension C7 5 5   Finger flexion C8 5 5   Finger abduction T1 5 5     Reflexes  ?  R L   Biceps  2+ 2+   Brachioradialis  2+ 2+     Mark’s sign negative bilaterally              MEDICAL DECISION MAKING    Medical records review: see under HPI section.     DATA    Labs:   Lab Results   Component Value Date/Time    SODIUM 139 09/23/2020 09:24 AM    POTASSIUM 3.8 09/23/2020 09:24 AM    CHLORIDE 102 09/23/2020 09:24 AM    CO2 27 09/23/2020 09:24 AM    ANION 10.0 09/23/2020 09:24 AM    GLUCOSE 99 09/23/2020 09:24 AM    BUN 11 09/23/2020 09:24 AM    CREATININE 0.77 09/23/2020 09:24 AM    CALCIUM 9.3 09/23/2020 09:24 AM    ASTSGOT 19 09/23/2020 09:24 AM    ALTSGPT 16 09/23/2020 09:24 AM    TBILIRUBIN 0.9 09/23/2020 09:24 AM    ALBUMIN 4.3 09/23/2020 09:24 AM    TOTPROTEIN 7.4 09/23/2020 09:24 AM    GLOBULIN 3.1 09/23/2020 09:24 AM    AGRATIO 1.4 09/23/2020 09:24 AM   ]    Lab Results   Component Value Date/Time    PROTHROMBTM 12.8 03/26/2020 12:30 PM    INR 0.95 03/26/2020 12:30 PM        Lab Results   Component Value Date/Time    WBC 4.8 09/23/2020 09:24 AM    RBC 4.49 09/23/2020 09:24 AM    HEMOGLOBIN 14.3 09/23/2020 09:24 AM    HEMATOCRIT 42.6 09/23/2020 09:24 AM    MCV 94.9 09/23/2020 09:24 AM    MCH 31.8 09/23/2020 09:24 AM    MCHC 33.6 09/23/2020 09:24 AM    MPV 8.8 (L) 09/23/2020 09:24 AM    NEUTSPOLYS 60.40 09/23/2020 09:24 AM    LYMPHOCYTES 26.10 09/23/2020 09:24 AM    MONOCYTES 8.80 09/23/2020 09:24 AM    EOSINOPHILS 3.50 09/23/2020 09:24 AM    BASOPHILS 1.00 09/23/2020 09:24 AM        No results found for: HBA1C     Imaging:   I personally reviewed following images, these are my reads  X-ray cervical spine 11/12/2020  Multiple surgical clips consistent with previous procedures.  Degenerative disc disease worst in the cervical spine at C5-6 and C6-7.  Facet arthropathy is present.    IMAGING radiology reads. I reviewed the following radiology reads                                   Results for orders  placed in visit on 01/08/21   MR-CERVICAL SPINE-W/O    Impression 1.  Mild degenerative disc disease most notable for C5-6 moderate-marked right foraminal stenosis and C6-C7 moderate bilateral foraminal stenosis.  2.  No central stenosis, cord impingement, or myelopathic cord signal at any cervical level.  3.  Several incidentally noted perineural cysts as outlined above, doubtful clinical significance.                                                                         Results for orders placed during the hospital encounter of 11/12/20   DX-CERVICAL SPINE-2 OR 3 VIEWS    Impression Minimal grade 1 anterolisthesis of C2 on C3 and C4 on C5.    Mild reversal of the normal cervical lordosis.    Multilevel degenerative changes, most prominent at C5/C6 and C6/C7.                                                                                           Diagnosis   Visit Diagnoses     ICD-10-CM   1. Neuralgia  M79.2   2. Cervical radiculopathy  M54.12   3. DDD (degenerative disc disease), cervical  M50.30   4. Cervical spondylosis  M47.812   5. Bilateral carpal tunnel syndrome  G56.03           ASSESSMENT AND PLAN:  Rafaela Frances Shelley 50 y.o. female      Rafaela was seen today for new patient.    Diagnoses and all orders for this visit:    Neuralgia  -     REFERRAL TO NEURODIAGNOSTICS (EEG,EP,EMG/NCS/DBS)  -     diclofenac sodium 1 % Gel; Place 3 g on the skin 4 times a day as needed (pain).  -     gabapentin (NEURONTIN) 100 MG Cap; Take 1-3 Capsules by mouth at bedtime as needed (pain).    Cervical radiculopathy  -     REFERRAL TO PHYSICAL MEDICINE REHAB  -     REFERRAL TO NEURODIAGNOSTICS (EEG,EP,EMG/NCS/DBS)  -     diclofenac sodium 1 % Gel; Place 3 g on the skin 4 times a day as needed (pain).  -     gabapentin (NEURONTIN) 100 MG Cap; Take 1-3 Capsules by mouth at bedtime as needed (pain).    DDD (degenerative disc disease), cervical  -     REFERRAL TO NEURODIAGNOSTICS (EEG,EP,EMG/NCS/DBS)  -     diclofenac  sodium 1 % Gel; Place 3 g on the skin 4 times a day as needed (pain).  -     gabapentin (NEURONTIN) 100 MG Cap; Take 1-3 Capsules by mouth at bedtime as needed (pain).    Cervical spondylosis  -     REFERRAL TO NEURODIAGNOSTICS (EEG,EP,EMG/NCS/DBS)  -     diclofenac sodium 1 % Gel; Place 3 g on the skin 4 times a day as needed (pain).  -     gabapentin (NEURONTIN) 100 MG Cap; Take 1-3 Capsules by mouth at bedtime as needed (pain).    Bilateral carpal tunnel syndrome  -     REFERRAL TO NEURODIAGNOSTICS (EEG,EP,EMG/NCS/DBS)  -     diclofenac sodium 1 % Gel; Place 3 g on the skin 4 times a day as needed (pain).  -     gabapentin (NEURONTIN) 100 MG Cap; Take 1-3 Capsules by mouth at bedtime as needed (pain).  -     REFERRAL TO PHYSIATRY (PMR)      I believe the patient has both a cervical radiculopathy and bilateral carpal tunnel syndrome as evidenced from exam, imaging and diagnostic ultrasound today.  I think she will require treatment for both diagnoses.    home exercise program: I provided the patient with a strengthening and stretching with a home exercise program     Diagnostic workup:     2/24/2021 I performed a limited diagnostic ultrasound for the patient's neuralgia ICD M79.2. I performed a limited diagnostic ultrasound of the RIGHT wrist including the median nerve to evaluate the cause of the patient's neuralgia. The median nerve which shows a cross-sectional area of 33 mm² at the level of the carpal tunnel outlet and 9 mm² at the level of the pronator quadratus. This is consistent with carpal tunnel syndrome.  The images were uploaded to the medical record.      2/24/2021 I performed a limited diagnostic ultrasound for the patient's neuralgia ICD M79.2. I performed a limited diagnostic ultrasound of the LEFT wrist including the median nerve to evaluate the cause of the patient's neuralgia. The median nerve which shows a cross-sectional area of 25 mm² at the level of the carpal tunnel outlet and 10 mm² at the  level of the pronator quadratus. This is consistent with carpal tunnel syndrome.  The images were uploaded to the medical record.      I ordered an EMG of the bilateral upper extremities.     Procedures below for diagnostic and therapeutic purposes.     Medications: as above    Interventional program:   I have ordered a C7-T1 interlaminar epidural steroid injection to be done in the procedure suite     I have also ordered a right versus left carpal tunel injection with ultrasound to be done in clinic    The risks benefits and alternatives to this procedure were discussed and the patient wishes to proceed with the procedure. Risks include but are not limited to damage to surrounding structures, infection, bleeding, worsening of pain which can be permanent, weakness which can be permanent. Benefits include pain relief, improved function. Alternatives includes not doing the procedure.             Follow-up: After the above diagnostic and procedures    I spent a total of 61 minutes on this case on the day of the encounter. This time was not including the ultrasound time.       Please note that this dictation was created using voice recognition software. I have made every reasonable attempt to correct obvious errors but there may be errors of grammar and content that I may have overlooked prior to finalization of this note.      Tato Kyle MD  Physical Medicine and Rehabilitation  Interventional Spine and Sports Physiatry  [facility]           CC Kosta Rodriguez M.D. .

## 2021-02-24 ENCOUNTER — OFFICE VISIT (OUTPATIENT)
Dept: PHYSICAL MEDICINE AND REHAB | Facility: MEDICAL CENTER | Age: 51
End: 2021-02-24
Payer: COMMERCIAL

## 2021-02-24 VITALS
TEMPERATURE: 97.4 F | BODY MASS INDEX: 22.39 KG/M2 | WEIGHT: 147.71 LBS | HEART RATE: 68 BPM | DIASTOLIC BLOOD PRESSURE: 80 MMHG | SYSTOLIC BLOOD PRESSURE: 108 MMHG | OXYGEN SATURATION: 97 % | HEIGHT: 68 IN

## 2021-02-24 DIAGNOSIS — M54.12 CERVICAL RADICULOPATHY: ICD-10-CM

## 2021-02-24 DIAGNOSIS — M50.30 DDD (DEGENERATIVE DISC DISEASE), CERVICAL: ICD-10-CM

## 2021-02-24 DIAGNOSIS — M47.812 CERVICAL SPONDYLOSIS: ICD-10-CM

## 2021-02-24 DIAGNOSIS — M79.2 NEURALGIA: ICD-10-CM

## 2021-02-24 DIAGNOSIS — G56.03 BILATERAL CARPAL TUNNEL SYNDROME: ICD-10-CM

## 2021-02-24 PROCEDURE — 99205 OFFICE O/P NEW HI 60 MIN: CPT | Mod: 25 | Performed by: PHYSICAL MEDICINE & REHABILITATION

## 2021-02-24 PROCEDURE — 76882 US LMTD JT/FCL EVL NVASC XTR: CPT | Mod: RT | Performed by: PHYSICAL MEDICINE & REHABILITATION

## 2021-02-24 RX ORDER — GABAPENTIN 100 MG/1
100-300 CAPSULE ORAL NIGHTLY PRN
Qty: 90 CAPSULE | Refills: 3 | Status: SHIPPED
Start: 2021-02-24 | End: 2021-05-24

## 2021-02-24 RX ORDER — EPINASTINE HCL 0.05 %
1 DROPS OPHTHALMIC (EYE) DAILY
COMMUNITY
End: 2023-06-29

## 2021-02-24 RX ORDER — LOTEPREDNOL ETABONATE 2.5 MG/ML
1 SUSPENSION/ DROPS OPHTHALMIC DAILY
COMMUNITY
End: 2023-06-29

## 2021-02-24 ASSESSMENT — PATIENT HEALTH QUESTIONNAIRE - PHQ9: CLINICAL INTERPRETATION OF PHQ2 SCORE: 0

## 2021-02-24 ASSESSMENT — PAIN SCALES - GENERAL: PAINLEVEL: 6=MODERATE PAIN

## 2021-02-24 ASSESSMENT — FIBROSIS 4 INDEX: FIB4 SCORE: 1.14

## 2021-02-24 NOTE — PATIENT INSTRUCTIONS
Your procedure will be at the [facility] special procedure suite.    [facility] [place] [address]       PRE-PROCEDURE INSTRUCTIONS  You may take your regular medications except:   · No Anti-inflammatories 5 days prior to your procedure. Anti-inflammatories include medicines such as  ibuprofen (Motrin, Advil), Excedrin, Naproxen (Aleve, Anaprox, Naprelan, Naprosyn), Celecoxib (Celebrex), Diclofenac (Voltaren-XR tab), and Meloxicam (Mobic).   · You can take the remainder of your pain medications as prescribed.   · If you are having a diagnostic procedure such as a medial branch block, do not use her pain meds on the day of the procedure  · No Glucophage or Metformin 24 hours before your procedure. You may resume next day after your procedure.  · Call the physiatry office if you are taking or prescribed anti-biotics within five days of procedure.  · Please ask provider if you are taking any new diabetes medication.  · CONTINUE TAKING BLOOD PRESSURE MEDICATIONS AS PRESCRIBED.  · Pain medications will not be prescribed on the procedure day. Procedural pain medication may be used by your provider   · Call your doctor's office performing the procedure if you have a fever, chills, rash or new illness prior to your procedure    Anticoagulation/antiplatelet medications  No Blood thinning medications such as Coumadin or Plavix 5 days prior to procedure unless your doctor said to continue these medications. Call your doctor if a new medication is prescribed in this class.     Restrictions for eating before procedure:   · If you are getting procedural sedation, then do not eat to for 8 hours prior to procedure appointment time. Do not drink fluids for four hours prior to your procedure time.   · If you are not having procedural sedation, then Skip the meal prior to your procedure. If you have a morning procedure then skip breakfast. If you have an afternoon procedure then skip lunch.   · You may drink clear  liquids up to 2 hours prior to your procedure  · You must have a  the day of procedure to accompany you home.      POST PROCEDURE INSTRUCTIONS   · No heavy lifting, strenuous bending or strenuous exercise for 3 days after your procedure.  · No hot tubs, baths, swimming for 3 days after your procedure  · You can remove the bandage the day after the procedure.  · IF YOU RECEIVED A STEROID INJECTION. PLEASE NOTE THAT THERE MAY BE A DELAY FOR THE INJECTION TO START WORKING, THE DELAY MAY BE UP TO TWO WEEKS. IF YOU HAVE DIABETES, PLEASE NOTE THAT YOUR SUGAR LEVELS MAY BE ELEVATED FOR 1-2 DAYS AFTER A STEROID INJECTION.  THE STEROID MAY CAUSE TEMPORARY SYMPTOMS WHICH USUALLY RESOLVE ON THEIR OWN WITHIN 1 TO 2 DAYS INCLUDING FACIAL FLUSHING OR A FEELING OF WARMTH ON THE FACE, TEMPORARY INCREASES IN BLOOD SUGAR, INSOMNIA, INCREASED HUNGER  · IF YOU EXPERIENCE PROLONGED WEAKNESS LONGER THAN ONE DAY, BOWEL OR BLADDER INCONTINENCE THEN PLEASE CALL THE PHYSIATRY OFFICE.  · Your leg may feel heavy, weak and numb for up to 1-2 days. Be very careful walking.   ·  You may resume normal activities 3 days after procedure.

## 2021-03-01 ENCOUNTER — HOSPITAL ENCOUNTER (OUTPATIENT)
Dept: RADIOLOGY | Facility: MEDICAL CENTER | Age: 51
End: 2021-03-01
Attending: OBSTETRICS & GYNECOLOGY
Payer: COMMERCIAL

## 2021-03-01 DIAGNOSIS — N93.0 POSTCOITAL BLEEDING: ICD-10-CM

## 2021-03-01 PROCEDURE — 76830 TRANSVAGINAL US NON-OB: CPT

## 2021-03-09 ENCOUNTER — OFFICE VISIT (OUTPATIENT)
Dept: PHYSICAL MEDICINE AND REHAB | Facility: MEDICAL CENTER | Age: 51
End: 2021-03-09
Payer: COMMERCIAL

## 2021-03-09 VITALS
BODY MASS INDEX: 22.52 KG/M2 | WEIGHT: 148.59 LBS | TEMPERATURE: 98.4 F | DIASTOLIC BLOOD PRESSURE: 84 MMHG | SYSTOLIC BLOOD PRESSURE: 116 MMHG | HEART RATE: 71 BPM | OXYGEN SATURATION: 98 % | HEIGHT: 68 IN

## 2021-03-09 DIAGNOSIS — G56.03 BILATERAL CARPAL TUNNEL SYNDROME: ICD-10-CM

## 2021-03-09 PROCEDURE — 76942 ECHO GUIDE FOR BIOPSY: CPT | Performed by: PHYSICAL MEDICINE & REHABILITATION

## 2021-03-09 PROCEDURE — 20526 THER INJECTION CARP TUNNEL: CPT | Mod: LT | Performed by: PHYSICAL MEDICINE & REHABILITATION

## 2021-03-09 RX ORDER — DEXAMETHASONE SODIUM PHOSPHATE 4 MG/ML
4 INJECTION, SOLUTION INTRA-ARTICULAR; INTRALESIONAL; INTRAMUSCULAR; INTRAVENOUS; SOFT TISSUE ONCE
Status: COMPLETED | OUTPATIENT
Start: 2021-03-09 | End: 2021-03-09

## 2021-03-09 RX ADMIN — DEXAMETHASONE SODIUM PHOSPHATE 4 MG: 4 INJECTION, SOLUTION INTRA-ARTICULAR; INTRALESIONAL; INTRAMUSCULAR; INTRAVENOUS; SOFT TISSUE at 13:23

## 2021-03-09 ASSESSMENT — PATIENT HEALTH QUESTIONNAIRE - PHQ9: CLINICAL INTERPRETATION OF PHQ2 SCORE: 0

## 2021-03-09 ASSESSMENT — FIBROSIS 4 INDEX: FIB4 SCORE: 1.14

## 2021-03-09 ASSESSMENT — PAIN SCALES - GENERAL: PAINLEVEL: 8=MODERATE-SEVERE PAIN

## 2021-03-09 NOTE — PROCEDURES
Date of Service: 3/9/2021    Patient: Rafaela Rosa 50 y.o. female MRN: [id number]     Physician/s: Tato Kyle MD    Pre-operative Diagnosis: LEFT  carpal tunnel syndrome    Post-operative Diagnosis: LEFT  carpal tunnel syndrome      Procedure: LEFT  ultrasound-guided carpal tunnel injection    Description of procedure:    The risks, benefits, and alternatives of the procedure were reviewed and discussed with the patient.  Written informed consent was freely obtained. A pre-procedural time-out was conducted by the physician verifying patient’s identity, procedure to be performed, procedure site and side, and allergy verification. Appropriate equipment was determined to be in place for the procedure.     No sedation was used for this procedure.     In the office suite the patient was placed in a sitting position, and her LEFT   hand/s were rested with the palm/s up on a sterile armendariz stand, and the skin was prepped and draped in the usual sterile fashion. Median nerve at the carpal tunnel was identified under ultrasound guidance. Also identified where the ulnar nerve, ulnar artery, radial artery to confirm the needle path would not be impacting the structures. Under ultrasound guidance with an in plane approach,  A 27g 1.5 inch needle was placed into skin and advanced adjacent so the needle path was deep to the median nerve. Following negative aspiration, a total of 1.5mL solution mixture was injected perineurally, containing approx 1mL of 1% Lidocaine with 0.5mL of 4mg/mL dexamethasone, 1mL of sterile saline. . The patient's skin was wiped with a 4x4 gauze, the area was cleansed with alcohol prep, and a bandaid was applied. There were no complications noted.     The images were uploaded to our secure system for permanent storage.     Minutes post procedure the patient had greater than 50% pain relief of her hand pain    Tato Kyle MD  Physical Medicine and Rehabilitation  Interventional Spine and  Sports Physiatry  [facility]

## 2021-03-12 ENCOUNTER — NON-PROVIDER VISIT (OUTPATIENT)
Dept: NEUROLOGY | Facility: MEDICAL CENTER | Age: 51
End: 2021-03-12
Attending: PHYSICAL MEDICINE & REHABILITATION
Payer: COMMERCIAL

## 2021-03-12 DIAGNOSIS — G56.03 CARPAL TUNNEL SYNDROME, BILATERAL: ICD-10-CM

## 2021-03-12 PROCEDURE — 95886 MUSC TEST DONE W/N TEST COMP: CPT | Performed by: SPECIALIST

## 2021-03-12 PROCEDURE — 95910 NRV CNDJ TEST 7-8 STUDIES: CPT | Mod: 26 | Performed by: SPECIALIST

## 2021-03-12 PROCEDURE — 95910 NRV CNDJ TEST 7-8 STUDIES: CPT | Performed by: SPECIALIST

## 2021-03-12 PROCEDURE — 95886 MUSC TEST DONE W/N TEST COMP: CPT | Mod: 26 | Performed by: SPECIALIST

## 2021-03-12 NOTE — PROCEDURES
NERVE CONDUCTION STUDIES AND ELECTROMYOGRAPHY REPORT        03/12/21      Referring provider: Anahi Magallanes M.D.      SUMMARY OF PATIENT'S CLINICAL HISTORY,PHYSICAL EXAM, AND RATIONALE FOR TESTING:    Ms. Rafaela Rosa 50 y.o. presenting with bilateral hand numbness and pain.   Past Medical History is significant for :   Past Medical History:   Diagnosis Date   • Anemia    • Thyroid disease        The electrodiagnostic studies were performed to evaluate for possible carpal tunnel syndromes.      ELECTRODIAGNOSTIC EXAMINATION:  Nerve conduction studies (NCS) and electromyography (EMG) are utilized to evaluate direct or indirect damage to the peripheral nervous system. NCS are performed to measure the nerve(s) response(s) to electrostimulation across a given nerve segment. EMG evaluates the passive and active electrical activity of the muscle(s) in question.  Muscles are innervated by specific peripheral nerves and roots. Often times, several nerves the muscle to be examined in order to determine the presence or absence of the disease process. Furthermore, nerves and muscles may need to be tested in a [id number] comparison, as well as in additional extremities, as this may be crucial in characterizing the extent of the disease process, which may be diffuse or isolated and of varying degree of severity. The extent of the neurodiagnostic exam is justified as it may help arrive to a proper diagnosis, which ultimately may contribute to better management of the patient. Therefore, the nerves to muscles examined during the study were medically necessary.    Unless otherwise noted, temperature of the extremity(s) study was monitored before and during the examination and remained between 32 and 36 degrees C for the upper extremities, and between 30 and 36 degrees C for the lower extremities.      NERVE CONDUCTION STUDIES:  Sensory nerves:   - Bilateral Median sensory nerves were examined.The response on the  left was within normal limits.  The response on the right was not obtainable with antidromic stimuli.  - Bilateral Ulnar sensory nerves were examined. The responses were within normal limits.    Motor nerves:   - Bilateral Median motor nerves were examined. Recording electrodes placed at the Abductor Pollicis Brevis muscles. The responses were abnormal bilaterally.  On the left onset latency was mildly prolonged at 4.2 ms, amplitude and conduction velocity were within normal limits.  On the right onset latency was prolonged at 5.9 ms, amplitude and conduction velocity were within normal limits.  - Bilateral Ulnar motor nerves were examined. Recording electrodes placed at the Abductor Digiti Minimi muscles. The response on the right was within normal limits.  The response on the left was abnormal with a mildly prolonged onset latency of 3.3 ms.          ELECTROMYOGRAPHY:  The study was performed the concentric needle electrode. Fibrillation and fasciculation activity is graded by convention from none (0) to continuous (4+).  Needle electrode examination was performed in the following muscles: Bilateral deltoid, biceps, triceps, first dorsal interosseous, abductor pollicis brevis.  No acute denervation changes were noted.  There was no increased insertional activity fibrillation potentials or positive sharp waves.  Mild chronic neuropathic changes consisting of decreased recruitment of increased amplitude potentials were noted in the right abductor pollicis brevis muscle.  All other muscles were normal electrophysiologically.      Nerve Conduction Studies     Stim Site NR Onset (ms) Norm Onset (ms) O-P Amp (µV) Norm O-P Amp Site1 Site2 Delta-P (ms) Dist (cm) Orestes (m/s) Norm Orestes (m/s)   Left Median Anti Sensory (2nd Digit)   Wrist    2.8 <3.6 25.3 >10 Wrist 2nd Digit 4.2 14.0 *33 >50   Right Median Anti Sensory (2nd Digit)   Wrist *NR  <3.6  >10 Wrist 2nd Digit  14.0  >50   Left Ulnar Anti Sensory (5th Digit)   Wrist     2.3 <3.1 45.5 >10 Wrist 5th Digit 3.3 14.0 *42 >50   Right Ulnar Anti Sensory (5th Digit)   Wrist    2.9 <3.1 36.9 >10 Wrist 5th Digit 3.8 14.0 *37 >50        Stim Site NR Onset (ms) Norm Onset (ms) O-P Amp (mV) Norm O-P Amp Site1 Site2 Delta-0 (ms) Dist (cm) Orestes (m/s) Norm Orestes (m/s)   Left Median Motor (Abd Poll Brev)   Wrist    *4.2 <4 9.8 >6 Elbow Wrist 4.9 25.0 51 >50   Elbow    9.1  9.8          Right Median Motor (Abd Poll Brev)   Wrist    *5.9 <4 8.4 >6 Elbow Wrist 4.3 26.0 60 >50   Elbow    10.2  8.3          Left Ulnar Motor (Abd Dig Min)   Wrist    *3.3 <3.1 10.2 >7 B Elbow Wrist 3.6 18.0 50 >50   B Elbow    6.9  9.1  A Elbow B Elbow 1.1 10.0 91    A Elbow    8.0  10.2          Right Ulnar Motor (Abd Dig Min)   Wrist    3.1 <3.1 10.7 >7 B Elbow Wrist 3.5 18.0 51 >50   B Elbow    6.6  8.5  A Elbow B Elbow 1.4 10.0 71    A Elbow    8.0  8.1                                Electromyography     Side Muscle Nerve Root Ins Act Fibs Psw Amp Dur Poly Recrt Int Pat Comment   Left Deltoid Axillary C5-6 Nml Nml Nml Nml Nml 0 Nml Nml    Left Biceps Musculocut C5-6 Nml Nml Nml Nml Nml 0 Nml Nml    Left Triceps Radial C6-7-8 Nml Nml Nml Nml Nml 0 Nml Nml    Left 1stDorInt Ulnar C8-T1 Nml Nml Nml Nml Nml 0 Nml Nml    Left Abd Poll Brev Median C8-T1 Nml Nml Nml Nml Nml 0 Nml Nml    Right Deltoid Axillary C5-6 Nml Nml Nml Nml Nml 0 Nml Nml    Right Biceps Musculocut C5-6 Nml Nml Nml Nml Nml 0 Nml Nml    Right Triceps Radial C6-7-8 Nml Nml Nml Nml Nml 0 Nml Nml    Right 1stDorInt Ulnar C8-T1 Nml Nml Nml Nml Nml 0 Nml Nml          DIAGNOSTIC INTERPRETATION:  Extensive electrodiagnostic studies were performed to the bilateral upper extremities.  The results are as follows:    1.  Right carpal tunnel syndrome which is moderate to severe electrophysiologically and associated with mild chronic neuropathic changes in median nerve supplied hand muscles.    2.  Left carpal tunnel syndrome which is mild electrophysiologically and not  associated with motor unit changes in median nerve supplied hand muscles.    3.  Mild left ulnar nerve slowing at the wrist without motor unit changes in ulnar nerve supplied hand muscles.    4.  No evidence of radiculopathy in selected muscle studied bilateral upper extremities.        DIANE Butler M.D.

## 2021-03-16 ENCOUNTER — TELEPHONE (OUTPATIENT)
Dept: PHYSICAL MEDICINE AND REHAB | Facility: MEDICAL CENTER | Age: 51
End: 2021-03-16

## 2021-03-23 NOTE — PREPROCEDURE INSTRUCTIONS
PAT note: PAT return call received 03/23/2021 at 1239. Spoke with Rafaela. Health history, allergies, medications and pre-procedure/sedation instructions reviewed with patient. Pre-procedure respiratory screening completed. Pt denies being sick/symptomatic(fever, cough, shortness of breath)  within 72 hours or having been in close contact with symptomatic individuals in the past 2 weeks.Pt was informed to contact his/her physician should he/she or any close personal contact become symptomatic prior to the procedure. Pt was told to bring a mask as he/she would be wearing it during the entire stay. It was recommended that the pt self isolate 72 hrs before the procedure and  recommend that his/her  remain on site til pt is d/naomi. Pt verbalized understanding of instructions.

## 2021-03-24 ENCOUNTER — APPOINTMENT (OUTPATIENT)
Dept: RADIOLOGY | Facility: REHABILITATION | Age: 51
End: 2021-03-24
Attending: PHYSICAL MEDICINE & REHABILITATION
Payer: COMMERCIAL

## 2021-03-24 ENCOUNTER — HOSPITAL ENCOUNTER (OUTPATIENT)
Facility: REHABILITATION | Age: 51
End: 2021-03-24
Attending: PHYSICAL MEDICINE & REHABILITATION | Admitting: PHYSICAL MEDICINE & REHABILITATION
Payer: COMMERCIAL

## 2021-03-24 VITALS
HEIGHT: 68 IN | RESPIRATION RATE: 16 BRPM | WEIGHT: 148.37 LBS | BODY MASS INDEX: 22.49 KG/M2 | TEMPERATURE: 98.6 F | DIASTOLIC BLOOD PRESSURE: 79 MMHG | OXYGEN SATURATION: 96 % | SYSTOLIC BLOOD PRESSURE: 153 MMHG | HEART RATE: 61 BPM

## 2021-03-24 PROCEDURE — 700117 HCHG RX CONTRAST REV CODE 255

## 2021-03-24 PROCEDURE — 62321 NJX INTERLAMINAR CRV/THRC: CPT

## 2021-03-24 PROCEDURE — 99152 MOD SED SAME PHYS/QHP 5/>YRS: CPT

## 2021-03-24 PROCEDURE — 700111 HCHG RX REV CODE 636 W/ 250 OVERRIDE (IP)

## 2021-03-24 RX ORDER — DEXAMETHASONE SODIUM PHOSPHATE 10 MG/ML
INJECTION, SOLUTION INTRAMUSCULAR; INTRAVENOUS
Status: COMPLETED
Start: 2021-03-24 | End: 2021-03-24

## 2021-03-24 RX ORDER — ONDANSETRON 2 MG/ML
4 INJECTION INTRAMUSCULAR; INTRAVENOUS
Status: DISCONTINUED | OUTPATIENT
Start: 2021-03-24 | End: 2021-03-24 | Stop reason: HOSPADM

## 2021-03-24 RX ORDER — MIDAZOLAM HYDROCHLORIDE 1 MG/ML
INJECTION INTRAMUSCULAR; INTRAVENOUS
Status: COMPLETED
Start: 2021-03-24 | End: 2021-03-24

## 2021-03-24 RX ORDER — LIDOCAINE HYDROCHLORIDE 10 MG/ML
INJECTION, SOLUTION EPIDURAL; INFILTRATION; INTRACAUDAL; PERINEURAL
Status: COMPLETED
Start: 2021-03-24 | End: 2021-03-24

## 2021-03-24 RX ADMIN — DEXAMETHASONE SODIUM PHOSPHATE 10 MG: 10 INJECTION, SOLUTION INTRAMUSCULAR; INTRAVENOUS at 13:57

## 2021-03-24 RX ADMIN — LIDOCAINE HYDROCHLORIDE 10 ML: 10 INJECTION, SOLUTION EPIDURAL; INFILTRATION; INTRACAUDAL; PERINEURAL at 13:55

## 2021-03-24 RX ADMIN — IOHEXOL 1 ML: 240 INJECTION, SOLUTION INTRATHECAL; INTRAVASCULAR; INTRAVENOUS; ORAL at 13:56

## 2021-03-24 RX ADMIN — FENTANYL CITRATE 50 MCG: 50 INJECTION, SOLUTION INTRAMUSCULAR; INTRAVENOUS at 13:52

## 2021-03-24 RX ADMIN — MIDAZOLAM HYDROCHLORIDE 1 MG: 1 INJECTION, SOLUTION INTRAMUSCULAR; INTRAVENOUS at 13:52

## 2021-03-24 ASSESSMENT — PAIN DESCRIPTION - PAIN TYPE
TYPE: CHRONIC PAIN
TYPE: CHRONIC PAIN

## 2021-03-24 ASSESSMENT — FIBROSIS 4 INDEX: FIB4 SCORE: 1.14

## 2021-03-24 NOTE — PROGRESS NOTES
Admitting health assessment, healthy.Current medications reviewed with pt, see medications reconciliation form. Pt denies taking ASA or other blood thinners or anti-inflammatories.  Pt has a ride post-procedure.  Printed and verbal discharge instructions given to pt who verbalized understanding.Infection control instruction given including flu shots and illness prevention.

## 2021-03-24 NOTE — INTERVAL H&P NOTE
H&P reviewed. The patient was examined and there are no changes to the H&P     Lungs clear to auscultation bilaterally.  No abdominal tenderness.  Heart regular rate and rhythm.  Vitals reviewed.    Proceed with the originally scheduled procedure.  The risks, benefits and alternatives were discussed.  The patient understands these.    Tato Kyle MD  Physical Medicine and Rehabilitation  Interventional Spine and Sports Physiatry  [facility]

## 2021-03-24 NOTE — OP REPORT
Date of Service: 3/24/2021    Physician/s: Tato Kyle MD    Pre-operative Diagnosis: Cervical radiculopathy    Post-operative Diagnosis: Cervical radiculopathy    Procedure: C7-T1  Cervical interlaminar epidural steroid injection    Description of procedure:    The risks, benefits, and alternatives of the procedure were reviewed and discussed with the patient.  Written informed consent was freely obtained. A pre-procedural time-out was conducted by the physician verifying patient’s identity, procedure to be performed, procedure site and side, and allergy verification. Appropriate equipment was determined to be in place for the procedure.     Moderation sedation was achieved with Versed (1mg) and Fentanyl (50mcg). Monitoring of the patients vital signs and respiratory status was provided by trained independent registered nurse during the entire course of the procedures and under my supervision and recoded in the patient’s medical record. The duration of sedation was over 10 minutes.     The patient's vital signs were carefully monitored before, throughout, and after the procedure.     In the fluoroscopy suite the patient was placed in a prone position, a pillow placed underneath their chest. The skin was prepped and draped in the usual sterile fashion. The fluoroscope was placed over the cervical neck at the appropriate injection AP angle view, and the target for injection was marked. A 25g needle was placed into the marked site, and approx 2cc of 1% Lidocaine was injected subcutaneously into the epidermal and dermal layers. The needle was removed. A 20g Tuohy needle was then placed and advanced under fluoroscopic guidance into the LEFT C7-T1 interlaminar space at both the initial position AP view and contralateral oblique at a lateral view to ensure proper location of the needle tip at all times.  The needle was advanced with fluoroscopic guidance to the superior aspect of the T1 lamina.  Then a contralateral  oblique view was used to advance the needle slightly towards the epidural space, A loss-of-resistance technique was used to guide the needle into the epidural space in a lateral fluoroscopic view and confirmed with loss of resistance with sterile normal saline. In the AP and lateral views, contrast dye was used.  to highlight the epidural space spread while the fluoroscope was running live. Following negative aspiration, 1mL of 10mg/mL of dexamethasone followed by 2 mL of sterile saline . The needle was removed intact after restyleted. The patient's back was covered with a 4x4 gauze, the area was cleansed with sterile normal saline, and a dressing was applied. There were no complications noted.     The patient was then evaluated post-procedure, and was hemodynamically stable prior to leaving the post-operative care unit.     Follow-up as scheduled    Tato Kyle MD  Physical Medicine and Rehabilitation  Interventional Spine and Sports Physiatry  [facility]        CPT  interlaminar cervical/thoracic epidural: [id number]  moderate procedural sedation first 15 minutes:[id number]

## 2021-03-25 ENCOUNTER — TELEPHONE (OUTPATIENT)
Dept: PHYSICAL MEDICINE AND REHAB | Facility: MEDICAL CENTER | Age: 51
End: 2021-03-25

## 2021-03-25 NOTE — NON-PROVIDER
With the help by team (  X- ray Tech, CST,RN ) patient  prone positioned  onto procedure  table.  Monitoring equipment  ( BP, pulse oximeter, EKG pad, oxygen  )  connected. Forehead on lower headrest of the bed, lower extremities supported with pillow. Patient identified. Site and procedure confirmed and ismael by Dr. Kyle .Medication allergies. Reviewed pertinent medical history ( hypothyroidism ). Aleve off for a week. Timeout completed, team and patient agreed.

## 2021-03-29 NOTE — H&P
Physical medicine and rehabilitation preprocedure history & Physical Note    Date  3/29/2021    Primary Care Physician  Anahi Magallanes M.D.    CC  Pre-Op Diagnosis Codes:     * Cervical radiculopathy [M54.12]     HPI  This is a 50 y.o. female who presented with Acute on chronic neck pain rating down bilateral arms moderate to severe in intensity worse at night worse with neck movements with associated numbness in the hands.  Failed conservative treatments with medication management and home exercise program.    See previous notes of Dr. Kyle    Past Medical History:   Diagnosis Date   • Anemia    • Thyroid disease        Past Surgical History:   Procedure Laterality Date   • BLOCK EPIDURAL STEROID INJECTION  3/24/2021    Procedure: INJECTION, STEROID, EPIDURAL;  Surgeon: Tato Kyle M.D.;  Location[facility];  Service: Pain Management   • LUMPECTOMY     • THYROIDECTOMY         No current facility-administered medications for this encounter.     Current Outpatient Medications   Medication Sig Dispense Refill   • Epinastine HCl 0.05 % Solution Administer  into affected eye(s).     • Loteprednol Etabonate (EYSUVIS) 0.25 % Suspension Administer  into affected eye(s).     • Naproxen Sodium (ALEVE PO) Take  by mouth.     • diclofenac sodium 1 % Gel Place 3 g on the skin 4 times a day as needed (pain). 100 g 3   • gabapentin (NEURONTIN) 100 MG Cap Take 1-3 Capsules by mouth at bedtime as needed (pain). 90 capsule 3   • levothyroxine (SYNTHROID) 75 MCG Tab Take 1 Tab by mouth Every morning on an empty stomach. 90 Tab 3   • liothyronine (CYTOMEL) 5 MCG Tab Take 0.5 Tabs by mouth every day. 90 Tab 3   • ferrous sulfate 325 (65 Fe) MG tablet Take 325 mg by mouth every day.         Social History     Socioeconomic History   • Marital status:      Spouse name: Not on file   • Number of children: Not on file   • Years of education: Not on file   • Highest education level: Not on file    Occupational History   • Not on file   Tobacco Use   • Smoking status: Never Smoker   • Smokeless tobacco: Never Used   Substance and Sexual Activity   • Alcohol use: Yes     Comment: 1-2 glasses of wine/weekly   • Drug use: Not on file   • Sexual activity: Not on file   Other Topics Concern   •  Service No   • Blood Transfusions No   • Caffeine Concern No   • Occupational Exposure No   • Hobby Hazards No   • Sleep Concern Yes   • Stress Concern Yes   • Weight Concern No   • Special Diet Yes   • Back Care No   • Exercise Yes   • Bike Helmet No   • Seat Belt Yes   • Self-Exams Yes   Social History Narrative   • Not on file     Social Determinants of Health     Financial Resource Strain:    • Difficulty of Paying Living Expenses:    Food Insecurity:    • Worried About Running Out of Food in the Last Year:    • Ran Out of Food in the Last Year:    Transportation Needs:    • Lack of Transportation (Medical):    • Lack of Transportation (Non-Medical):    Physical Activity:    • Days of Exercise per Week:    • Minutes of Exercise per Session:    Stress:    • Feeling of Stress :    Social Connections:    • Frequency of Communication with Friends and Family:    • Frequency of Social Gatherings with Friends and Family:    • Attends Hoahaoism Services:    • Active Member of Clubs or Organizations:    • Attends Club or Organization Meetings:    • Marital Status:    Intimate Partner Violence:    • Fear of Current or Ex-Partner:    • Emotionally Abused:    • Physically Abused:    • Sexually Abused:        Family History   Problem Relation Age of Onset   • Heart Disease Mother    • Cancer Mother    • Cancer Father    • Alcohol/Drug Brother        Allergies  Anesthesia s-i-40 [propofol], Boost high protein  [ ], and Gluten meal    Review of Systems  Comprehensive review of systems was negative       Physical Exam  Constitutional:       General: She is not in acute distress.     Appearance: Normal appearance. She is not  ill-appearing, toxic-appearing or diaphoretic.   Cardiovascular:      Rate and Rhythm: Normal rate and regular rhythm.      Pulses: Normal pulses.      Heart sounds: Normal heart sounds.   Pulmonary:      Effort: Pulmonary effort is normal.      Breath sounds: Normal breath sounds.   Abdominal:      General: Abdomen is flat. Bowel sounds are normal. There is no distension.      Palpations: Abdomen is soft.      Tenderness: There is no abdominal tenderness. There is no guarding or rebound.   Skin:     General: Skin is warm and dry.      Capillary Refill: Capillary refill takes less than 2 seconds.   Neurological:      Mental Status: She is alert.          Vital Signs  Blood Pressure: 153/79   Temperature: 37 °C (98.6 °F)   Pulse: 61   Respiration: 16   Pulse Oximetry: 96 %     Vitals reviewed    Labs:                    Radiology:  DX-PORTABLE FLUOROSCOPY < 1 HOUR Is the patient pregnant? Unknown    (Results Pending)         Assessment/Plan:  Pre-Op Diagnosis Codes:     * Cervical radiculopathy [M54.12]  Procedure(s) with comments:  INJECTION, STEROID, EPIDURAL - C7-T1 INTERLAMINAR EPIDURAL STEROID INJECTION

## 2021-03-31 ENCOUNTER — PATIENT MESSAGE (OUTPATIENT)
Dept: MEDICAL GROUP | Facility: MEDICAL CENTER | Age: 51
End: 2021-03-31

## 2021-03-31 DIAGNOSIS — H04.129 DRY EYE: ICD-10-CM

## 2021-03-31 DIAGNOSIS — H02.889 MEIBOMIAN GLAND DISEASE, UNSPECIFIED LATERALITY: ICD-10-CM

## 2021-04-21 ENCOUNTER — OFFICE VISIT (OUTPATIENT)
Dept: PHYSICAL MEDICINE AND REHAB | Facility: MEDICAL CENTER | Age: 51
End: 2021-04-21
Payer: COMMERCIAL

## 2021-04-21 VITALS
OXYGEN SATURATION: 96 % | SYSTOLIC BLOOD PRESSURE: 112 MMHG | HEIGHT: 68 IN | WEIGHT: 151.01 LBS | HEART RATE: 70 BPM | DIASTOLIC BLOOD PRESSURE: 72 MMHG | BODY MASS INDEX: 22.89 KG/M2 | TEMPERATURE: 98.3 F

## 2021-04-21 DIAGNOSIS — M50.30 DDD (DEGENERATIVE DISC DISEASE), CERVICAL: ICD-10-CM

## 2021-04-21 DIAGNOSIS — R20.0 LEFT LEG NUMBNESS: ICD-10-CM

## 2021-04-21 DIAGNOSIS — M54.12 CERVICAL RADICULOPATHY: ICD-10-CM

## 2021-04-21 DIAGNOSIS — M47.812 CERVICAL SPONDYLOSIS: ICD-10-CM

## 2021-04-21 DIAGNOSIS — G89.29 CHRONIC LEFT-SIDED LOW BACK PAIN WITH LEFT-SIDED SCIATICA: ICD-10-CM

## 2021-04-21 DIAGNOSIS — G56.03 BILATERAL CARPAL TUNNEL SYNDROME: ICD-10-CM

## 2021-04-21 DIAGNOSIS — M54.42 CHRONIC LEFT-SIDED LOW BACK PAIN WITH LEFT-SIDED SCIATICA: ICD-10-CM

## 2021-04-21 DIAGNOSIS — M54.16 LUMBAR RADICULOPATHY: ICD-10-CM

## 2021-04-21 PROCEDURE — 99214 OFFICE O/P EST MOD 30 MIN: CPT | Performed by: PHYSICAL MEDICINE & REHABILITATION

## 2021-04-21 ASSESSMENT — FIBROSIS 4 INDEX: FIB4 SCORE: 1.14

## 2021-04-21 ASSESSMENT — PAIN SCALES - GENERAL: PAINLEVEL: 4=SLIGHT-MODERATE PAIN

## 2021-04-21 ASSESSMENT — PATIENT HEALTH QUESTIONNAIRE - PHQ9: CLINICAL INTERPRETATION OF PHQ2 SCORE: 0

## 2021-04-21 NOTE — PROGRESS NOTES
Follow up patient note  Interventional spine and sports physiatry, Physical medicine rehabilitation      Chief complaint:   Chief Complaint   Patient presents with   • Follow-Up     Neck pain       HISTORY    Please see new patient note by Dr Kyle,  for more details.     HPI  Patient identification: Rafaela Rosa ,  1970,   With Diagnoses of Bilateral carpal tunnel syndrome, Cervical radiculopathy, DDD (degenerative disc disease), cervical, Cervical spondylosis, Chronic left-sided low back pain with left-sided sciatica, Lumbar radiculopathy, and Left leg numbness were pertinent to this visit.     Procedures:  3/9/2021 left carpal tunnel injection 100% improved after injection  3/24/2021 C7-T1 interlaminar epidural steroid injection 90% improved after injection      The patient is significantly improved after the carpal tunnel injection and interlaminar cervical epidural steroid injection.  Now her neck pain rating down the arm has stopped.  Hand numbness and pain has stopped.  She does get intermittent aching pain in the neck which is nonradiating.  She has been increasing her exercise and has noticed more muscular soreness in the bilateral shoulders which can be attributed to the increase in exercises.  Upper body dressing and ADLs are more easily accomplished.    The patient has acute on chronic bilateral low back pain rating down the posterior lateral aspect of the left leg towards the lateral aspect of the foot with associated numbness and tingling sensation.  Worse with sitting and bending.  Causing functional deficits.  Difficulty with ADLs including lower body dressing.  She has failed a home exercise program given to her by her physicians and medication management.    She continues to take gabapentin mostly for her low back pain       ROS Red Flags :   Fever, Chills, Sweats: Denies  Involuntary Weight Loss: Denies  Bowel/Bladder Incontinence: Denies  Saddle Anesthesia:  Denies        PMHx:   Past Medical History:   Diagnosis Date   • Anemia    • Thyroid disease        PSHx:   Past Surgical History:   Procedure Laterality Date   • BLOCK EPIDURAL STEROID INJECTION  3/24/2021    Procedure: INJECTION, STEROID, EPIDURAL;  Surgeon: Tato Kyle M.D.;  [facility];  Service: Pain Management   • LUMPECTOMY     • THYROIDECTOMY         Family history   Family History   Problem Relation Age of Onset   • Heart Disease Mother    • Cancer Mother    • Cancer Father    • Alcohol/Drug Brother          Medications:   Outpatient Medications Marked as Taking for the 4/21/21 encounter (Office Visit) with Tato Kyle M.D.   Medication Sig Dispense Refill   • Epinastine HCl 0.05 % Solution Administer  into affected eye(s).     • Loteprednol Etabonate (EYSUVIS) 0.25 % Suspension Administer  into affected eye(s).     • Naproxen Sodium (ALEVE PO) Take  by mouth.     • diclofenac sodium 1 % Gel Place 3 g on the skin 4 times a day as needed (pain). 100 g 3   • gabapentin (NEURONTIN) 100 MG Cap Take 1-3 Capsules by mouth at bedtime as needed (pain). 90 capsule 3   • levothyroxine (SYNTHROID) 75 MCG Tab Take 1 Tab by mouth Every morning on an empty stomach. 90 Tab 3   • liothyronine (CYTOMEL) 5 MCG Tab Take 0.5 Tabs by mouth every day. 90 Tab 3   • ferrous sulfate 325 (65 Fe) MG tablet Take 325 mg by mouth every day.          Current Outpatient Medications on File Prior to Visit   Medication Sig Dispense Refill   • Epinastine HCl 0.05 % Solution Administer  into affected eye(s).     • Loteprednol Etabonate (EYSUVIS) 0.25 % Suspension Administer  into affected eye(s).     • Naproxen Sodium (ALEVE PO) Take  by mouth.     • diclofenac sodium 1 % Gel Place 3 g on the skin 4 times a day as needed (pain). 100 g 3   • gabapentin (NEURONTIN) 100 MG Cap Take 1-3 Capsules by mouth at bedtime as needed (pain). 90 capsule 3   • levothyroxine (SYNTHROID) 75 MCG Tab Take 1 Tab by mouth  Every morning on an empty stomach. 90 Tab 3   • liothyronine (CYTOMEL) 5 MCG Tab Take 0.5 Tabs by mouth every day. 90 Tab 3   • ferrous sulfate 325 (65 Fe) MG tablet Take 325 mg by mouth every day.       No current facility-administered medications on file prior to visit.         Allergies:   Allergies   Allergen Reactions   • Anesthesia S-I-40 [Propofol]    • Boost High Protein  [ ] Diarrhea, Nausea and Vomiting   • Gluten Meal        Social Hx:   Social History     Socioeconomic History   • Marital status:      Spouse name: Not on file   • Number of children: Not on file   • Years of education: Not on file   • Highest education level: Not on file   Occupational History   • Not on file   Tobacco Use   • Smoking status: Never Smoker   • Smokeless tobacco: Never Used   Substance and Sexual Activity   • Alcohol use: Yes     Comment: 1-2 glasses of wine/weekly   • Drug use: Not on file   • Sexual activity: Not on file   Other Topics Concern   •  Service No   • Blood Transfusions No   • Caffeine Concern No   • Occupational Exposure No   • Hobby Hazards No   • Sleep Concern Yes   • Stress Concern Yes   • Weight Concern No   • Special Diet Yes   • Back Care No   • Exercise Yes   • Bike Helmet No   • Seat Belt Yes   • Self-Exams Yes   Social History Narrative   • Not on file     Social Determinants of Health     Financial Resource Strain:    • Difficulty of Paying Living Expenses:    Food Insecurity:    • Worried About Running Out of Food in the Last Year:    • Ran Out of Food in the Last Year:    Transportation Needs:    • Lack of Transportation (Medical):    • Lack of Transportation (Non-Medical):    Physical Activity:    • Days of Exercise per Week:    • Minutes of Exercise per Session:    Stress:    • Feeling of Stress :    Social Connections:    • Frequency of Communication with Friends and Family:    • Frequency of Social Gatherings with Friends and Family:    • Attends Restoration Services:    • Active  "Member of Clubs or Organizations:    • Attends Club or Organization Meetings:    • Marital Status:    Intimate Partner Violence:    • Fear of Current or Ex-Partner:    • Emotionally Abused:    • Physically Abused:    • Sexually Abused:          EXAMINATION     Physical Exam:   Vitals: /72 (BP Location: Right arm, Patient Position: Sitting, BP Cuff Size: Adult)   Pulse 70   Temp 36.8 °C (98.3 °F) (Temporal)   Ht 1.727 m (5' 8\")   Wt 68.5 kg (151 lb 0.2 oz)   SpO2 96%     Constitutional:   Body Habitus: Body mass index is 22.96 kg/m².  Cooperation: Fully cooperates with exam  Appearance: Well-groomed no disheveled    Respiratory-  breathing comfortable on room air, no audible wheezing  Cardiovascular- capillary refills less than 2 seconds. No lower extremity edema is noted.   Psychiatric- alert and oriented ×3. Normal affect.    MSK and Neuro: -  Bilateral hands:   Inspection: No swelling,  Deformities or rashes. Symmetric appearing thenar and hyperthenar regions bilaterally.  Palpation no significant tenderness to palpation throughout the bilateral hands  Range of motion is within normal limits throughout bilateral hands, fingers and wrist.  Special tests:  Tinel's at the wrist over the median nerve negative bilaterally  Carpal tunnel compression: negative bilaterally  Phalen's test: negative bilaterally  Finkelstein's test: negative bilaterally  CMC grind test negative bilaterally     Cervical spine  There are no signs of infection around the injection sites.     full  active range of motion with flexion, lateral flexion, and rotation bilaterally.   There is full  active range of motion with cervical extension.      Palpation:   Tenderness to palpation throughout the cervical spine: negative bilaterally        Cervical spine Special tests  Neuro tension  Spurling's maneuver negative bilaterally           Key points for the international standards for neurological classification of spinal cord injury " (ISNCSCI) to light touch.     Dermatome R L   C4 2 2   C5 2 2   C6 2 2   C7 2 2   C8 2 2   T1 2 2   T2 2 2                                         Motor Exam Upper Extremities   ? Myotome R L   Shoulder flexion C5 5 5   Elbow flexion C5 5 5   Wrist extension C6 5 5   Elbow extension C7 5 5   Finger flexion C8 5 5   Finger abduction T1 5 5       There are no signs of infection around the injection sites.   decreased active range of motion with flexion, lateral flexion, and rotation bilaterally.   There is decreased active range of motion with lumbar extension.    There is  pain with lumbar extension.   There  pain with facet loading maneuver (extension rotation) with axial low back pain on the BILATERAL side(s)    Palpation:   No tenderness to palpation in midline at T1-T12 levels. No tenderness to palpation in the left and right of the midline T1-L5, NEGATIVE for tenderness to palpation to the para-midline region in the lower lumbar levels.  palpation over SI joint: negative bilaterally    palpation in hip or over the gluteus medius tendon insertion: negative bilaterally      Lumbar spine Special tests  Neuro tension  Straight leg test negative right, positive left    Slump test negative right, positive left      Key points for the international standards for neurological classification of spinal cord injury (ISNCSCI) to light touch.     Dermatome R L                                      L2 2 2   L3 2 2   L4 2 2   L5 2 2   S1 2 2   S2 2 2         Motor Exam Lower Extremities    ? Myotome R L   Hip flexion L2 5 5   Knee extension L3 5 5   Ankle dorsiflexion L4 5 5   Toe extension L5 5 5   Ankle plantarflexion S1 5 5                 MEDICAL DECISION MAKING    DATA    Labs:   Lab Results   Component Value Date/Time    SODIUM 139 09/23/2020 09:24 AM    POTASSIUM 3.8 09/23/2020 09:24 AM    CHLORIDE 102 09/23/2020 09:24 AM    CO2 27 09/23/2020 09:24 AM    GLUCOSE 99 09/23/2020 09:24 AM    BUN 11 09/23/2020 09:24 AM     CREATININE 0.77 09/23/2020 09:24 AM        Lab Results   Component Value Date/Time    PROTHROMBTM 12.8 03/26/2020 12:30 PM    INR 0.95 03/26/2020 12:30 PM        Lab Results   Component Value Date/Time    WBC 4.8 09/23/2020 09:24 AM    RBC 4.49 09/23/2020 09:24 AM    HEMOGLOBIN 14.3 09/23/2020 09:24 AM    HEMATOCRIT 42.6 09/23/2020 09:24 AM    MCV 94.9 09/23/2020 09:24 AM    MCH 31.8 09/23/2020 09:24 AM    MCHC 33.6 09/23/2020 09:24 AM    MPV 8.8 (L) 09/23/2020 09:24 AM    NEUTSPOLYS 60.40 09/23/2020 09:24 AM    LYMPHOCYTES 26.10 09/23/2020 09:24 AM    MONOCYTES 8.80 09/23/2020 09:24 AM    EOSINOPHILS 3.50 09/23/2020 09:24 AM    BASOPHILS 1.00 09/23/2020 09:24 AM        No results found for: HBA1C       Imaging:   I personally reviewed following images    X-ray cervical spine 11/12/2020  Multiple surgical clips consistent with previous procedures.  Degenerative disc disease worst in the cervical spine at C5-6 and C6-7.  Facet arthropathy is present.       I reviewed the following radiology reports                                 Results for orders placed in visit on 01/08/21   MR-CERVICAL SPINE-W/O    Impression 1.  Mild degenerative disc disease most notable for C5-6 moderate-marked right foraminal stenosis and C6-C7 moderate bilateral foraminal stenosis.  2.  No central stenosis, cord impingement, or myelopathic cord signal at any cervical level.  3.  Several incidentally noted perineural cysts as outlined above, doubtful clinical significance.                                                                         Results for orders placed during the hospital encounter of 11/12/20   DX-CERVICAL SPINE-2 OR 3 VIEWS    Impression Minimal grade 1 anterolisthesis of C2 on C3 and C4 on C5.    Mild reversal of the normal cervical lordosis.    Multilevel degenerative changes, most prominent at C5/C6 and C6/C7.                                                                                           DIAGNOSIS   Visit  Diagnoses     ICD-10-CM   1. Bilateral carpal tunnel syndrome  G56.03   2. Cervical radiculopathy  M54.12   3. DDD (degenerative disc disease), cervical  M50.30   4. Cervical spondylosis  M47.812   5. Chronic left-sided low back pain with left-sided sciatica  M54.42    G89.29   6. Lumbar radiculopathy  M54.16   7. Left leg numbness  R20.0         ASSESSMENT and PLAN:     Rafaela Rosa  1970 female      Rafaela was seen today for follow-up.    Diagnoses and all orders for this visit:    Bilateral carpal tunnel syndrome  Comments:  Improved after carpal tunnel injection home exercises and stretching    Cervical radiculopathy  Comments:  Improved after cervical epidural steroid injection.  Now stable.    DDD (degenerative disc disease), cervical  Comments:  Stable    Cervical spondylosis  Comments:  Stable.  Continue home exercise program    Chronic left-sided low back pain with left-sided sciatica  Comments:  Not controlled.  Orders:  -     DX-LUMBAR SPINE-2 OR 3 VIEWS; Future  -     MR-LUMBAR SPINE-W/O; Future    Lumbar radiculopathy  -     DX-LUMBAR SPINE-2 OR 3 VIEWS; Future  -     MR-LUMBAR SPINE-W/O; Future    Left leg numbness  -     DX-LUMBAR SPINE-2 OR 3 VIEWS; Future  -     MR-LUMBAR SPINE-W/O; Future      For the patient's low back pain I believe this is secondary to a left lower lumbar radiculopathy with possibly superimposed on facet mediated pain.  She has failed conservative treatments with home exercise program medication management and stretching and exercises.  She continues to have pain and functional deficits.  An MRI is necessary at this point.  I would consider the patient for an epidural steroid injection in the lumbar spine depending on the results of the imaging.    Continue gabapentin 100-300 mg nightly as needed.    Follow up: After the above diagnostic tests    Thank you for allowing me to participate in the care of this patient. If you have any questions please not  hesitate to contact me.             Please note that this dictation was created using voice recognition software. I have made every reasonable attempt to correct obvious errors but there may be errors of grammar and content that I may have overlooked prior to finalization of this note.      Tato Kyle MD  Interventional Spine and Sports Physiatry  Physical Medicine and Rehabilitation  [facility]

## 2021-05-13 ENCOUNTER — HOSPITAL ENCOUNTER (OUTPATIENT)
Dept: RADIOLOGY | Facility: MEDICAL CENTER | Age: 51
End: 2021-05-13
Attending: PHYSICAL MEDICINE & REHABILITATION
Payer: COMMERCIAL

## 2021-05-13 DIAGNOSIS — G89.29 CHRONIC LEFT-SIDED LOW BACK PAIN WITH LEFT-SIDED SCIATICA: ICD-10-CM

## 2021-05-13 DIAGNOSIS — M54.16 LUMBAR RADICULOPATHY: ICD-10-CM

## 2021-05-13 DIAGNOSIS — M54.42 CHRONIC LEFT-SIDED LOW BACK PAIN WITH LEFT-SIDED SCIATICA: ICD-10-CM

## 2021-05-13 DIAGNOSIS — R20.0 LEFT LEG NUMBNESS: ICD-10-CM

## 2021-05-13 PROCEDURE — 72148 MRI LUMBAR SPINE W/O DYE: CPT

## 2021-05-13 PROCEDURE — 72100 X-RAY EXAM L-S SPINE 2/3 VWS: CPT

## 2021-05-19 ENCOUNTER — OFFICE VISIT (OUTPATIENT)
Dept: PHYSICAL MEDICINE AND REHAB | Facility: MEDICAL CENTER | Age: 51
End: 2021-05-19
Payer: COMMERCIAL

## 2021-05-19 VITALS
WEIGHT: 147.71 LBS | HEART RATE: 86 BPM | BODY MASS INDEX: 22.39 KG/M2 | OXYGEN SATURATION: 95 % | HEIGHT: 68 IN | TEMPERATURE: 98 F | SYSTOLIC BLOOD PRESSURE: 108 MMHG | DIASTOLIC BLOOD PRESSURE: 72 MMHG

## 2021-05-19 DIAGNOSIS — M50.30 DDD (DEGENERATIVE DISC DISEASE), CERVICAL: ICD-10-CM

## 2021-05-19 DIAGNOSIS — M47.812 CERVICAL SPONDYLOSIS: ICD-10-CM

## 2021-05-19 DIAGNOSIS — M54.16 LUMBAR RADICULOPATHY: ICD-10-CM

## 2021-05-19 DIAGNOSIS — M54.12 CERVICAL RADICULOPATHY: ICD-10-CM

## 2021-05-19 DIAGNOSIS — G56.03 BILATERAL CARPAL TUNNEL SYNDROME: ICD-10-CM

## 2021-05-19 PROCEDURE — 76942 ECHO GUIDE FOR BIOPSY: CPT | Performed by: PHYSICAL MEDICINE & REHABILITATION

## 2021-05-19 PROCEDURE — 99214 OFFICE O/P EST MOD 30 MIN: CPT | Mod: 25 | Performed by: PHYSICAL MEDICINE & REHABILITATION

## 2021-05-19 PROCEDURE — 20526 THER INJECTION CARP TUNNEL: CPT | Mod: RT | Performed by: PHYSICAL MEDICINE & REHABILITATION

## 2021-05-19 RX ORDER — DEXAMETHASONE SODIUM PHOSPHATE 4 MG/ML
4 INJECTION, SOLUTION INTRA-ARTICULAR; INTRALESIONAL; INTRAMUSCULAR; INTRAVENOUS; SOFT TISSUE ONCE
Status: COMPLETED | OUTPATIENT
Start: 2021-05-19 | End: 2021-05-19

## 2021-05-19 RX ADMIN — DEXAMETHASONE SODIUM PHOSPHATE 4 MG: 4 INJECTION, SOLUTION INTRA-ARTICULAR; INTRALESIONAL; INTRAMUSCULAR; INTRAVENOUS; SOFT TISSUE at 10:44

## 2021-05-19 ASSESSMENT — FIBROSIS 4 INDEX: FIB4 SCORE: 1.14

## 2021-05-19 ASSESSMENT — PATIENT HEALTH QUESTIONNAIRE - PHQ9: CLINICAL INTERPRETATION OF PHQ2 SCORE: 0

## 2021-05-19 ASSESSMENT — PAIN SCALES - GENERAL: PAINLEVEL: 7=MODERATE-SEVERE PAIN

## 2021-05-19 NOTE — PROGRESS NOTES
Follow up patient note  Interventional spine and sports physiatry, Physical medicine rehabilitation      Chief complaint:   Chief Complaint   Patient presents with   • Follow-Up     Neck pain       HISTORY    Please see new patient note by Dr Kyle,  for more details.     HPI  Patient identification: Rafaela Rosa ,  1970,   With Diagnoses of Bilateral carpal tunnel syndrome, Cervical radiculopathy, Cervical spondylosis, DDD (degenerative disc disease), cervical, and Lumbar radiculopathy secondary to large left S1 perineural cyst were pertinent to this visit.     Procedures:  3/9/2021 left carpal tunnel injection 100% improved after injection  3/24/2021 C7-T1 interlaminar epidural steroid injection 90% improved after injection for 2 weeks then the pain returned    Chronic constant neck pain 7 out of 10 in intensity worsening.  Also with hand numbness tingling burning sensation right worse than left. The pain returned from her carpal tunnel syndrome with pain in the first three digits on the right hand.     Chronic bilateral low back pain left worse than right 5 out of 10 intensity which can be worse at times.  Radiates down the posterior lateral aspect of the left leg.  Aching quality.  With associated numbness and paresthesias of the left lower extremity on the lateral aspect of the foot not including the 1st webspace    ROS Red Flags :   Fever: Denies  Involuntary Weight Loss: Denies  Bowel/Bladder Incontinence: Denies  Saddle Anesthesia: Denies        PMHx:   Past Medical History:   Diagnosis Date   • Anemia    • Thyroid disease        PSHx:   Past Surgical History:   Procedure Laterality Date   • BLOCK EPIDURAL STEROID INJECTION  3/24/2021    Procedure: INJECTION, STEROID, EPIDURAL;  Surgeon: Tato Kyle M.D.;  [facility];  Service: Pain Management   • LUMPECTOMY     • THYROIDECTOMY         Family history   Family History   Problem Relation Age of Onset   •  Heart Disease Mother    • Cancer Mother    • Cancer Father    • Alcohol/Drug Brother          Medications:   Outpatient Medications Marked as Taking for the 5/19/21 encounter (Office Visit) with Tato Kyle M.D.   Medication Sig Dispense Refill   • Epinastine HCl 0.05 % Solution Administer  into affected eye(s).     • Loteprednol Etabonate (EYSUVIS) 0.25 % Suspension Administer  into affected eye(s).     • Naproxen Sodium (ALEVE PO) Take  by mouth.     • diclofenac sodium 1 % Gel Place 3 g on the skin 4 times a day as needed (pain). 100 g 3   • levothyroxine (SYNTHROID) 75 MCG Tab Take 1 Tab by mouth Every morning on an empty stomach. 90 Tab 3   • liothyronine (CYTOMEL) 5 MCG Tab Take 0.5 Tabs by mouth every day. 90 Tab 3   • ferrous sulfate 325 (65 Fe) MG tablet Take 325 mg by mouth every day.       Current Facility-Administered Medications for the 5/19/21 encounter (Office Visit) with Tato Kyle M.D.   Medication Dose Route Frequency Provider Last Rate Last Admin   • dexamethasone (DECADRON) injection 4 mg  4 mg Injection Once Tato Kyle M.D.            Current Outpatient Medications on File Prior to Visit   Medication Sig Dispense Refill   • Epinastine HCl 0.05 % Solution Administer  into affected eye(s).     • Loteprednol Etabonate (EYSUVIS) 0.25 % Suspension Administer  into affected eye(s).     • Naproxen Sodium (ALEVE PO) Take  by mouth.     • diclofenac sodium 1 % Gel Place 3 g on the skin 4 times a day as needed (pain). 100 g 3   • levothyroxine (SYNTHROID) 75 MCG Tab Take 1 Tab by mouth Every morning on an empty stomach. 90 Tab 3   • liothyronine (CYTOMEL) 5 MCG Tab Take 0.5 Tabs by mouth every day. 90 Tab 3   • ferrous sulfate 325 (65 Fe) MG tablet Take 325 mg by mouth every day.     • gabapentin (NEURONTIN) 100 MG Cap Take 1-3 Capsules by mouth at bedtime as needed (pain). (Patient not taking: Reported on 5/19/2021) 90 capsule 3     No current facility-administered medications on file  prior to visit.         Allergies:   Allergies   Allergen Reactions   • Anesthesia S-I-40 [Propofol]    • Boost High Protein  [ ] Diarrhea, Nausea and Vomiting   • Gluten Meal        Social Hx:   Social History     Socioeconomic History   • Marital status:      Spouse name: Not on file   • Number of children: Not on file   • Years of education: Not on file   • Highest education level: Not on file   Occupational History   • Not on file   Tobacco Use   • Smoking status: Never Smoker   • Smokeless tobacco: Never Used   Vaping Use   • Vaping Use: Never used   Substance and Sexual Activity   • Alcohol use: Yes     Comment: 1-2 glasses of wine/weekly   • Drug use: Not on file   • Sexual activity: Not on file   Other Topics Concern   •  Service No   • Blood Transfusions No   • Caffeine Concern No   • Occupational Exposure No   • Hobby Hazards No   • Sleep Concern Yes   • Stress Concern Yes   • Weight Concern No   • Special Diet Yes   • Back Care No   • Exercise Yes   • Bike Helmet No   • Seat Belt Yes   • Self-Exams Yes   Social History Narrative   • Not on file     Social Determinants of Health     Financial Resource Strain:    • Difficulty of Paying Living Expenses:    Food Insecurity:    • Worried About Running Out of Food in the Last Year:    • Ran Out of Food in the Last Year:    Transportation Needs:    • Lack of Transportation (Medical):    • Lack of Transportation (Non-Medical):    Physical Activity:    • Days of Exercise per Week:    • Minutes of Exercise per Session:    Stress:    • Feeling of Stress :    Social Connections:    • Frequency of Communication with Friends and Family:    • Frequency of Social Gatherings with Friends and Family:    • Attends Yazdanism Services:    • Active Member of Clubs or Organizations:    • Attends Club or Organization Meetings:    • Marital Status:    Intimate Partner Violence:    • Fear of Current or Ex-Partner:    • Emotionally Abused:    • Physically Abused:   "  • Sexually Abused:          EXAMINATION     Physical Exam:   Vitals: /72 (BP Location: Left arm, Patient Position: Sitting, BP Cuff Size: Adult)   Pulse 86   Temp 36.7 °C (98 °F) (Temporal)   Ht 1.727 m (5' 8\")   Wt 67 kg (147 lb 11.3 oz)   SpO2 95%     Constitutional:   Body Habitus: Body mass index is 22.46 kg/m².  Cooperation: Fully cooperates with exam  Appearance: Well-groomed no disheveled    Respiratory-  breathing comfortable on room air, no audible wheezing  Cardiovascular- capillary refills less than 2 seconds. No lower extremity edema is noted.   Psychiatric- alert and oriented ×3. Normal affect.    MSK and Neuro: -    Bilateral hands:   Inspection: No swelling,  Deformities or rashes. Symmetric appearing thenar and hyperthenar regions bilaterally.  Palpation no significant tenderness to palpation throughout the bilateral hands  Range of motion is within normal limits throughout bilateral hands, fingers and wrist.  Special tests:  Tinel's at the wrist over the median nerve positive right, negative left  Carpal tunnel compression: positive right, negative left  Phalen's test: positive right, negative left  Finkelstein's test: positive right, negative left  CMC grind test positive right, negative left    Cervical spine  There are no signs of infection around the injection sites.     decreased active range of motion with flexion, lateral flexion, and rotation bilaterally.   There is decreased active range of motion with cervical extension.      Palpation:   Tenderness to palpation throughout the cervical spine: negative bilaterally        Cervical spine Special tests  Neuro tension  Spurling's maneuver positive right, negative left           Key points for the international standards for neurological classification of spinal cord injury (ISNCSCI) to light touch.     Dermatome R L   C4 2 2   C5 2 2   C6 2 2   C7 2 2   C8 2 2   T1 2 2   T2 2 2                                         Motor Exam " Upper Extremities   ? Myotome R L   Shoulder flexion C5 5 5   Elbow flexion C5 5 5   Wrist extension C6 5 5   Elbow extension C7 5 5   Finger flexion C8 5 5   Finger abduction T1 5 5           There are no signs of infection around the injection sites.   decreased active range of motion with flexion, lateral flexion, and rotation bilaterally.   There is decreased active range of motion with lumbar extension.      Palpation:   No tenderness to palpation in midline at T1-T12 levels. No tenderness to palpation in the left and right of the midline T1-L5, NEGATIVE for tenderness to palpation to the para-midline region in the lower lumbar levels.  palpation over SI joint: negative bilaterally    palpation in hip or over the gluteus medius tendon insertion: negative bilaterally      Lumbar spine Special tests  Neuro tension  Straight leg test negative right, positive left    Slump test negative right, positive left      Key points for the international standards for neurological classification of spinal cord injury (ISNCSCI) to light touch.     Dermatome R L                                      L2 2 2   L3 2 2   L4 2 2   L5 2 2   S1 2 1   S2 2 2         Motor Exam Lower Extremities    ? Myotome R L   Hip flexion L2 5 5   Knee extension L3 5 5   Ankle dorsiflexion L4 5 5   Toe extension L5 5 5   Ankle plantarflexion S1 5 5                   MEDICAL DECISION MAKING    DATA    Labs:   Lab Results   Component Value Date/Time    SODIUM 139 09/23/2020 09:24 AM    POTASSIUM 3.8 09/23/2020 09:24 AM    CHLORIDE 102 09/23/2020 09:24 AM    CO2 27 09/23/2020 09:24 AM    GLUCOSE 99 09/23/2020 09:24 AM    BUN 11 09/23/2020 09:24 AM    CREATININE 0.77 09/23/2020 09:24 AM        Lab Results   Component Value Date/Time    PROTHROMBTM 12.8 03/26/2020 12:30 PM    INR 0.95 03/26/2020 12:30 PM        Lab Results   Component Value Date/Time    WBC 4.8 09/23/2020 09:24 AM    RBC 4.49 09/23/2020 09:24 AM    HEMOGLOBIN 14.3 09/23/2020 09:24 AM     HEMATOCRIT 42.6 09/23/2020 09:24 AM    MCV 94.9 09/23/2020 09:24 AM    MCH 31.8 09/23/2020 09:24 AM    MCHC 33.6 09/23/2020 09:24 AM    MPV 8.8 (L) 09/23/2020 09:24 AM    NEUTSPOLYS 60.40 09/23/2020 09:24 AM    LYMPHOCYTES 26.10 09/23/2020 09:24 AM    MONOCYTES 8.80 09/23/2020 09:24 AM    EOSINOPHILS 3.50 09/23/2020 09:24 AM    BASOPHILS 1.00 09/23/2020 09:24 AM        No results found for: HBA1C       Imaging:   I personally reviewed following images    X-ray cervical spine 11/12/2020  Multiple surgical clips consistent with previous procedures.  Degenerative disc disease worst in the cervical spine at C5-6 and C6-7.  Facet arthropathy is present.    MRI lumbar spine 5/13/2021  There is no significant degenerative disc disease at any level.  No high-grade neuroforaminal or central canal stenosis.  There is a large perineural cyst around the S1 nerve on the left       I reviewed the following radiology reports    MRI lumbar spine 5/13/2021  IMPRESSION:   1.  No significant spinal canal foraminal narrowing   2.  Left S1 level large perineural cyst measuring maximum of 2.9 cm   3.  Small right perineural cyst measuring 10 mm   4.  Annular disc bulge and facet arthropathy   5.  Mild dextroconvex scoliosis                                 Results for orders placed in visit on 01/08/21   MR-CERVICAL SPINE-W/O    Impression 1.  Mild degenerative disc disease most notable for C5-6 moderate-marked right foraminal stenosis and C6-C7 moderate bilateral foraminal stenosis.  2.  No central stenosis, cord impingement, or myelopathic cord signal at any cervical level.  3.  Several incidentally noted perineural cysts as outlined above, doubtful clinical significance.                                                                         Results for orders placed during the hospital encounter of 11/12/20   DX-CERVICAL SPINE-2 OR 3 VIEWS    Impression Minimal grade 1 anterolisthesis of C2 on C3 and C4 on C5.    Mild reversal of the  normal cervical lordosis.    Multilevel degenerative changes, most prominent at C5/C6 and C6/C7.                          Electrodiagnostics   3/12/2021 EMG/nerve conduction study done by Dr. Adina Butler  DIAGNOSTIC INTERPRETATION:  Extensive electrodiagnostic studies were performed to the bilateral upper extremities.  The results are as follows:   1.  Right carpal tunnel syndrome which is moderate to severe electrophysiologically and associated with mild chronic neuropathic changes in median nerve supplied hand muscles.  2.  Left carpal tunnel syndrome which is mild electrophysiologically and not associated with motor unit changes in median nerve supplied hand muscles.  3.  Mild left ulnar nerve slowing at the wrist without motor unit changes in ulnar nerve supplied hand muscles.  4.  No evidence of radiculopathy in selected muscle studied bilateral upper extremities                                                                     DIAGNOSIS   Visit Diagnoses     ICD-10-CM   1. Bilateral carpal tunnel syndrome  G56.03   2. Cervical radiculopathy  M54.12   3. Cervical spondylosis  M47.812   4. DDD (degenerative disc disease), cervical  M50.30   5. Lumbar radiculopathy secondary to large left S1 perineural cyst  M54.16         ASSESSMENT and PLAN:     Rafaela Frances Shelley  1970 female      Rafaela was seen today for follow-up.    Diagnoses and all orders for this visit:    Bilateral carpal tunnel syndrome  -     dexamethasone (DECADRON) injection 4 mg    Cervical radiculopathy    Cervical spondylosis    DDD (degenerative disc disease), cervical    Lumbar radiculopathy secondary to large left S1 perineural cyst         For the patient's neck pain and upper extremity pain I believe this is secondary to a combination of her cervical radiculopathy with significant neuroforaminal stenosis worst at the C5-6 and C6-7 levels.  This is superimposed on severe carpal tunnel syndrome of the right upper extremity.   There is mild carpal tunnel in the left upper extremity.    For the patient's lumbar radiculopathy I think this is secondary to a large perineural cyst around the S1 nerve which is consistent with her symptoms.  There are no imaging findings of significant neuroforaminal or central canal stenosis.    She can continue to use gabapentin prn.    I am referring the patient back to Dr. Rodriguez for consideration of cervical spinal decompression, carpal tunnel surgery as well as surgery to address the S1 radiculopathy from the perineural cyst.    Follow up: as needed with me    Thank you for allowing me to participate in the care of this patient. If you have any questions please not hesitate to contact me.             Please note that this dictation was created using voice recognition software. I have made every reasonable attempt to correct obvious errors but there may be errors of grammar and content that I may have overlooked prior to finalization of this note.      Tato Kyle MD  Interventional Spine and Sports Physiatry  Physical Medicine and Rehabilitation  [facility]

## 2021-05-19 NOTE — PROCEDURES
Date of Service: 5/19/2021    Patient: Rafaela Rosa 50 y.o. female MRN: [id number]     Physician/s: Tato Kyle MD    Pre-operative Diagnosis: RIGHT  carpal tunnel syndrome    Post-operative Diagnosis: RIGHT  carpal tunnel syndrome      Procedure: RIGHT  ultrasound-guided carpal tunnel injection    Description of procedure:    The risks, benefits, and alternatives of the procedure were reviewed and discussed with the patient.  Written informed consent was freely obtained. A pre-procedural time-out was conducted by the physician verifying patient’s identity, procedure to be performed, procedure site and side, and allergy verification. Appropriate equipment was determined to be in place for the procedure.     No sedation was used for this procedure.     In the office suite the patient was placed in a sitting position, and her RIGHT   hand/s were rested with the palm/s up on a sterile armendariz stand, and the skin was prepped and draped in the usual sterile fashion. Median nerve at the carpal tunnel was identified under ultrasound guidance. Also identified where the ulnar nerve, ulnar artery, radial artery to confirm the needle path would not be impacting the structures. Under ultrasound guidance with an in plane approach,  A 27g 1.5 inch needle was placed into skin and advanced adjacent so the needle path was deep to the median nerve. Following negative aspiration, a total of 1.5mL solution mixture was injected perineurally, containing approx 1mL of 1% Lidocaine with 0.5mL of 4mg/mL dexamethasone, 1mL of sterile saline. . The patient's skin was wiped with a 4x4 gauze, the area was cleansed with alcohol prep, and a bandaid was applied. There were no complications noted.     The images were uploaded to our secure system for permanent storage.         Tato Kyle MD  Physical Medicine and Rehabilitation  Interventional Spine and Sports Physiatry  [facility]

## 2021-05-21 ENCOUNTER — PATIENT MESSAGE (OUTPATIENT)
Dept: PHYSICAL MEDICINE AND REHAB | Facility: MEDICAL CENTER | Age: 51
End: 2021-05-21

## 2021-05-21 DIAGNOSIS — G56.03 BILATERAL CARPAL TUNNEL SYNDROME: ICD-10-CM

## 2021-05-21 DIAGNOSIS — M54.16 LUMBAR RADICULOPATHY: ICD-10-CM

## 2021-05-21 DIAGNOSIS — M54.12 CERVICAL RADICULOPATHY: ICD-10-CM

## 2021-05-21 DIAGNOSIS — M47.812 CERVICAL SPONDYLOSIS: ICD-10-CM

## 2021-05-21 DIAGNOSIS — M50.30 DDD (DEGENERATIVE DISC DISEASE), CERVICAL: ICD-10-CM

## 2021-05-24 RX ORDER — TIZANIDINE 4 MG/1
4 TABLET ORAL NIGHTLY PRN
Qty: 30 TABLET | Refills: 2 | Status: SHIPPED
Start: 2021-05-24 | End: 2021-09-03

## 2021-05-24 NOTE — PROGRESS NOTES
Abigail is here for her postpartum checkup and denies any problems after delivery.  She is bottle-feeding only.    She had a   OB History    Para Term  AB Living   1 1 0 1 0 1   SAB TAB Ectopic Multiple Live Births   0 0 0 0 1      # Outcome Date GA Lbr Randy/2nd Weight Sex Delivery Anes PTL Lv   1  20 36w2d   M    VIRAJ      Obstetric Comments   FOB is Iglesia   It's a BOY!      Since delivery, she has not been breast feeding.  She has not had a normal menses.  She has not had intercourse.  Patient screened for postpartum depression and complaints are NEGATIVE. Screening has also been completed for intimate partner violence. She would like to discuss her contraceptive options so these were reviewed with her.      PE: BP 98/64   Pulse 71   Wt 69.8 kg (153 lb 12.8 oz)   LMP 2019   SpO2 97%   Breastfeeding No   BMI 31.87 kg/m    Body mass index is 31.87 kg/m .    General Appearance:  healthy, alert, active, no distress  Cardiovascular:  Regular rate and Rhythm without murmur  Lungs:  Clear, without wheeze, rale or rhonchi  Abdomen: Soft, flat, non-tender, No masses, organomegaly, No inguinal nodes and Bowel sounds normoactive.   Pelvic:       - Ext: Vulva and perineum are normal without lesion, mass or discharge        - Bladder: no tenderness, no masses       - Vagina: Normal mucosa, no discharge, her right labial laceration has healed well        - Cervix: normal and multiparous       - Uterus:Normal shape, position and consistencyfirm, nontender, nongravid uterus without CMT       - Adnexa: Normal without masses or tenderness       - Rectal: deferred    A/P  Routine Postpartum Exam     - I discussed the new pap recommendations regarding screening.  Explained the rationale for increased intervals between paps.  Questions asked and answered.  She does agree to this regiment.   - Pap was not performed since not due until 2022   - Contraception: She prefers to use spermicidal foam and  Stop gabapentin because of cognitive side effects.  This will be listed as an allergy.    Start Zanaflex nightly as needed   condoms but will call back if she changes her mind    Mady Collazo, DO  FACOG, FACS

## 2021-05-25 DIAGNOSIS — M43.02 CERVICAL SPONDYLOLYSIS: ICD-10-CM

## 2021-05-25 DIAGNOSIS — M54.12 CERVICAL RADICULOPATHY: ICD-10-CM

## 2021-05-25 DIAGNOSIS — M54.16 LUMBAR RADICULOPATHY: ICD-10-CM

## 2021-05-25 DIAGNOSIS — M50.30 DDD (DEGENERATIVE DISC DISEASE), CERVICAL: ICD-10-CM

## 2021-06-21 ENCOUNTER — OFFICE VISIT (OUTPATIENT)
Dept: ENDOCRINOLOGY | Facility: MEDICAL CENTER | Age: 51
End: 2021-06-21
Attending: NURSE PRACTITIONER
Payer: COMMERCIAL

## 2021-06-21 VITALS
OXYGEN SATURATION: 100 % | HEART RATE: 80 BPM | HEIGHT: 67 IN | WEIGHT: 149 LBS | DIASTOLIC BLOOD PRESSURE: 70 MMHG | SYSTOLIC BLOOD PRESSURE: 108 MMHG | BODY MASS INDEX: 23.39 KG/M2

## 2021-06-21 DIAGNOSIS — E53.8 VITAMIN B 12 DEFICIENCY: ICD-10-CM

## 2021-06-21 DIAGNOSIS — E55.9 VITAMIN D DEFICIENCY: ICD-10-CM

## 2021-06-21 DIAGNOSIS — E89.0 POSTSURGICAL HYPOTHYROIDISM: ICD-10-CM

## 2021-06-21 DIAGNOSIS — Z85.850 HISTORY OF THYROID CANCER: ICD-10-CM

## 2021-06-21 PROCEDURE — 99212 OFFICE O/P EST SF 10 MIN: CPT | Performed by: NURSE PRACTITIONER

## 2021-06-21 PROCEDURE — 99214 OFFICE O/P EST MOD 30 MIN: CPT | Performed by: NURSE PRACTITIONER

## 2021-06-21 RX ORDER — CYCLOSPORINE 0 G/ML
1 SOLUTION/ DROPS OPHTHALMIC; TOPICAL DAILY
COMMUNITY
End: 2023-06-29

## 2021-06-21 RX ORDER — CETIRIZINE 2.4 MG/ML
1 SOLUTION/ DROPS OPHTHALMIC DAILY
COMMUNITY
Start: 2021-05-07 | End: 2023-06-29

## 2021-06-21 RX ORDER — MULTIVIT WITH MINERALS/LUTEIN
100 TABLET ORAL DAILY
COMMUNITY

## 2021-06-21 RX ORDER — LANOLIN ALCOHOL/MO/W.PET/CERES
1000 CREAM (GRAM) TOPICAL DAILY
COMMUNITY

## 2021-06-21 ASSESSMENT — FIBROSIS 4 INDEX: FIB4 SCORE: 1.14

## 2021-06-21 NOTE — PROGRESS NOTES
REASON FOR CONSULTATION:  Consult requested by Anahi Magallanes M.D. for evaluation of postsurgical hypothyroidism.    HPI:     Rafaela Rosa is a 50 y.o. female, who had initial total thyroidectomy and 38 lymph nodes removed in 1992 [facility] in [place], [place].  Pathology is unknown and these reports were not available during this appointment.  Patient reports 1 round of radiation therapy and refused a second round. Patient did have problems with postoperative hypocalcemia, chooses to drink OJ with added Calcium for replacment.  Patient reports pins-and-needles in her fingertips at times.  Neck ultrasound was last done on uknown date showing no evidence of recurrence.    In review of patient's chart in epic there is no current ultrasound of the neck or record of ultrasound of neck on file.    Denies hoarseness or voice changes.  Patient denies any known neck nodules.  Patient denies shortness of breath or cough.  Patient denies history of head or neck radiation other than radioactive iodine therapy following thyroidectomy. Reports perioral or peripheral paresthesias, diagnosed with carpal tunnel bilaterally 2019.  Patient denies family history of thyroid disease and/or cancer of the thyroid.    Patient is currently taking levothyroxine 75 micrograms daily and liothyronine 5mg , which has been her dose for several months.  Patient is feeling well.  Energy level has been decreased for a couple of years.  Weight is stable.  No palpitations, no frequent diarrhea, or frequent constipation.  No heat or cold intolerance.  No anterior neck symptoms or problems.     History of vitamin D deficiency. Discontinued vitamin D year ago secondary to fatigue and     Patient reports she is taking Vitamin B supplementation for energy.     History of menopause 09/2020.  Patient reports hot flashes constantly, always warm, increased moodiness and anxiousness.     ROS:      CONS:     No fever, no chills, no  weight loss, no fatigue   EYES:      No diplopia, no blurry vision, no redness of eyes, no swelling of eyelids   ENT:    No hearing loss, No ear pain, No sore throat, no dysphagia, no neck swelling   CV:     No chest pain, no palpitations, no claudication, no orthopnea, no PND   PULM:    No SOB, no cough, no hemoptysis, no wheezing    GI:   No nausea, no vomiting, no diarrhea, no constipation, no bloody stools   :  Passing urine well, no dysuria, no hematuria   ENDO:   No polyuria, no polydipsia, no heat intolerance, no cold intolerance   NEURO: No headaches, no dizziness, no convulsions, no tremors   MUSC:  No joint swellings, no arthralgias, no myalgias, no weakness   SKIN:   No rash, no ulcers, no dry skin   PSYCH:   No depression, no anxiety, no difficulty sleeping       Past Medical History:  Patient Active Problem List    Diagnosis Date Noted   • Iron deficiency anemia 04/13/2018   • Acquired hypothyroidism 03/02/2018   • History of thyroid cancer 03/02/2018   • Primary insomnia 03/02/2018       Past Surgical History:  Past Surgical History:   Procedure Laterality Date   • BLOCK EPIDURAL STEROID INJECTION  3/24/2021    Procedure: INJECTION, STEROID, EPIDURAL;  Surgeon: Tato Kyle M.D.;  [facility];  Service: Pain Management   • LUMPECTOMY     • THYROIDECTOMY          Allergies:  Anesthesia s-i-40 [propofol], Boost high protein  [ ], Gabapentin, Gluten meal, and Other food     Current Medications:    Current Outpatient Medications:   •  ZERVIATE 0.24 % Solution, , Disp: , Rfl:   •  cycloSPORINE, PF, (CEQUA) 0.09 % Solution, Administer  into affected eye(s)., Disp: , Rfl:   •  Ascorbic Acid (VITAMIN C) 1000 MG Tab, Take  by mouth., Disp: , Rfl:   •  Cyanocobalamin (VITAMIN B-12) 1000 MCG Tab, Take 1,000 mcg by mouth every day., Disp: , Rfl:   •  tizanidine (ZANAFLEX) 4 MG Tab, Take 1 tablet by mouth at bedtime as needed (muscle spasms)., Disp: 30 tablet, Rfl: 2  •  Epinastine  HCl 0.05 % Solution, Administer  into affected eye(s)., Disp: , Rfl:   •  Loteprednol Etabonate (EYSUVIS) 0.25 % Suspension, Administer  into affected eye(s)., Disp: , Rfl:   •  Naproxen Sodium (ALEVE PO), Take 200 mg by mouth 3 times a day., Disp: , Rfl:   •  diclofenac sodium 1 % Gel, Place 3 g on the skin 4 times a day as needed (pain)., Disp: 100 g, Rfl: 3  •  levothyroxine (SYNTHROID) 75 MCG Tab, Take 1 Tab by mouth Every morning on an empty stomach., Disp: 90 Tab, Rfl: 3  •  liothyronine (CYTOMEL) 5 MCG Tab, Take 0.5 Tabs by mouth every day. (Patient taking differently: Take 5 mcg by mouth every day.), Disp: 90 Tab, Rfl: 3  •  ferrous sulfate 325 (65 Fe) MG tablet, Take 325 mg by mouth every day., Disp: , Rfl:     Social History:  Social History     Socioeconomic History   • Marital status:      Spouse name: Not on file   • Number of children: Not on file   • Years of education: Not on file   • Highest education level: Not on file   Occupational History   • Not on file   Tobacco Use   • Smoking status: Never Smoker   • Smokeless tobacco: Never Used   Vaping Use   • Vaping Use: Never used   Substance and Sexual Activity   • Alcohol use: Yes     Comment: 1-2 glasses of wine/weekly   • Drug use: Not on file   • Sexual activity: Not on file   Other Topics Concern   •  Service No   • Blood Transfusions No   • Caffeine Concern No   • Occupational Exposure No   • Hobby Hazards No   • Sleep Concern Yes   • Stress Concern Yes   • Weight Concern No   • Special Diet Yes   • Back Care No   • Exercise Yes   • Bike Helmet No   • Seat Belt Yes   • Self-Exams Yes   Social History Narrative   • Not on file     Social Determinants of Health     Financial Resource Strain:    • Difficulty of Paying Living Expenses:    Food Insecurity:    • Worried About Running Out of Food in the Last Year:    • Ran Out of Food in the Last Year:    Transportation Needs:    • Lack of Transportation (Medical):    • Lack of  "Transportation (Non-Medical):    Physical Activity:    • Days of Exercise per Week:    • Minutes of Exercise per Session:    Stress:    • Feeling of Stress :    Social Connections:    • Frequency of Communication with Friends and Family:    • Frequency of Social Gatherings with Friends and Family:    • Attends Rastafarian Services:    • Active Member of Clubs or Organizations:    • Attends Club or Organization Meetings:    • Marital Status:    Intimate Partner Violence:    • Fear of Current or Ex-Partner:    • Emotionally Abused:    • Physically Abused:    • Sexually Abused:         Family History:   Family History   Problem Relation Age of Onset   • Heart Disease Mother    • Cancer Mother    • Cancer Father    • Alcohol/Drug Brother          PHYSICAL EXAM:   Vital signs: /70   Pulse 80   Ht 1.702 m (5' 7\")   Wt 67.6 kg (149 lb)   SpO2 100%   BMI 23.34 kg/m²   GENERAL: Well-developed, well-nourished  in no apparent distress.   EYE: No ocular and eyelid asymmetry, Anicteric sclerae,  PERRL, No exophthalmos or lidlag  HENT: Hearing grossly intact, Normocephalic, atraumatic. Pink, moist mucous membranes, No exudate  NECK: Supple. Trachea midline.   CARDIOVASCULAR: Regular rate and rhythm. No murmurs, rubs, or gallops.   LUNGS: Clear to auscultation bilaterally   ABDOMEN: Soft, nontender with positive bowel sounds.   EXTREMITIES: No clubbing, cyanosis, or edema.   NEUROLOGICAL: Cranial nerves II-XII are grossly intact   Symmetric reflexes at the patella no proximal muscle weakness, No visible tremor with both outstretched hands  LYMPH: No cervical, supraclavicular,  adenopathy palpated.   SKIN: No rashes, lesions. Turgor is normal.    ASSESSMENT/PLAN:     1. History of thyroid cancer  Stable.  Patient requires updated imaging, TSI and thyroglobulin levels.      2. Postsurgical hypothyroidism  Stable.  Continue taking levothyroxine 75 mcg daily and Cytomel 5 mcg daily.  Have thyroid function panel drawn before " next appointment.  We will continue to monitor levels.  Patient is 20 years post total thyroidectomy.  She is past the 5-year ismael recommended for TSH suppression to reduce recurrence of thyroid cancer.  We will wait until we have current ultrasound study before making any changes to these thyroid hormones.    3. Vitamin D deficiency  Stable.      4. Vitamin B 12 deficiency  Stable.  Continue to take vitamin D supplementation as needed.        Thank you kindly for allowing me to participate in the thyroid care plan for this patient.    Niecy Pedro, APRN  06/21/21    CC:   Anaih Magallanes M.D.

## 2021-06-23 ENCOUNTER — PATIENT MESSAGE (OUTPATIENT)
Dept: ENDOCRINOLOGY | Facility: MEDICAL CENTER | Age: 51
End: 2021-06-23

## 2021-06-23 NOTE — TELEPHONE ENCOUNTER
From: Rafaela Rosa  To: Nurse Practitioner Niecy Pedro  Sent: 6/23/2021 9:56 AM PDT  Subject: Non-Urgent Medical Question    Good Morning,  I'm checking on the Lab Order and Ultrasound Order that was to be issued for me in appointment on Monday 6/21.     I've already scheduled the Lab appointment for next Tuesday 6/29. I was also just told I need to get a new MRI & XRAY of my neck and am hoping to have the Ultrasound included on that visit as well but cannot schedule them until the Ultrasound Order has been processed.    Thank you for your assistance.  Kindly,  Rafaela

## 2021-06-25 ENCOUNTER — PATIENT MESSAGE (OUTPATIENT)
Dept: MEDICAL GROUP | Facility: MEDICAL CENTER | Age: 51
End: 2021-06-25

## 2021-06-25 DIAGNOSIS — F43.20 ADJUSTMENT DISORDER, UNSPECIFIED TYPE: ICD-10-CM

## 2021-06-26 DIAGNOSIS — Z85.850 HISTORY OF THYROID CANCER: ICD-10-CM

## 2021-06-29 ENCOUNTER — HOSPITAL ENCOUNTER (OUTPATIENT)
Dept: LAB | Facility: MEDICAL CENTER | Age: 51
End: 2021-06-29
Attending: NURSE PRACTITIONER
Payer: COMMERCIAL

## 2021-06-29 DIAGNOSIS — Z85.850 HISTORY OF THYROID CANCER: ICD-10-CM

## 2021-06-29 PROCEDURE — 84481 FREE ASSAY (FT-3): CPT

## 2021-06-29 PROCEDURE — 84445 ASSAY OF TSI GLOBULIN: CPT

## 2021-06-29 PROCEDURE — 86800 THYROGLOBULIN ANTIBODY: CPT

## 2021-06-29 PROCEDURE — 84439 ASSAY OF FREE THYROXINE: CPT

## 2021-06-29 PROCEDURE — 36415 COLL VENOUS BLD VENIPUNCTURE: CPT

## 2021-06-29 PROCEDURE — 84432 ASSAY OF THYROGLOBULIN: CPT

## 2021-06-29 PROCEDURE — 84443 ASSAY THYROID STIM HORMONE: CPT

## 2021-06-30 LAB
T3FREE SERPL-MCNC: 2.87 PG/ML (ref 2–4.4)
T4 FREE SERPL-MCNC: 1.05 NG/DL (ref 0.93–1.7)
TSH SERPL DL<=0.005 MIU/L-ACNC: 0.06 UIU/ML (ref 0.38–5.33)

## 2021-07-01 ENCOUNTER — HOSPITAL ENCOUNTER (OUTPATIENT)
Dept: RADIOLOGY | Facility: MEDICAL CENTER | Age: 51
End: 2021-07-01
Attending: NURSE PRACTITIONER
Payer: COMMERCIAL

## 2021-07-01 DIAGNOSIS — Z85.850 HISTORY OF THYROID CANCER: ICD-10-CM

## 2021-07-01 PROCEDURE — 76536 US EXAM OF HEAD AND NECK: CPT

## 2021-07-02 LAB — TSI SER-ACNC: <0.1 IU/L

## 2021-08-18 ENCOUNTER — OFFICE VISIT (OUTPATIENT)
Dept: ENDOCRINOLOGY | Facility: MEDICAL CENTER | Age: 51
End: 2021-08-18
Attending: NURSE PRACTITIONER
Payer: COMMERCIAL

## 2021-08-18 VITALS
WEIGHT: 149.5 LBS | HEIGHT: 68 IN | OXYGEN SATURATION: 98 % | DIASTOLIC BLOOD PRESSURE: 71 MMHG | HEART RATE: 85 BPM | BODY MASS INDEX: 22.66 KG/M2 | SYSTOLIC BLOOD PRESSURE: 120 MMHG

## 2021-08-18 DIAGNOSIS — E55.9 VITAMIN D DEFICIENCY: ICD-10-CM

## 2021-08-18 DIAGNOSIS — E53.8 VITAMIN B 12 DEFICIENCY: ICD-10-CM

## 2021-08-18 DIAGNOSIS — Z85.850 HISTORY OF THYROID CANCER: ICD-10-CM

## 2021-08-18 DIAGNOSIS — N95.1 PERIMENOPAUSAL: ICD-10-CM

## 2021-08-18 PROCEDURE — 99211 OFF/OP EST MAY X REQ PHY/QHP: CPT | Performed by: NURSE PRACTITIONER

## 2021-08-18 PROCEDURE — 99214 OFFICE O/P EST MOD 30 MIN: CPT | Performed by: NURSE PRACTITIONER

## 2021-08-18 ASSESSMENT — FIBROSIS 4 INDEX: FIB4 SCORE: 1.14

## 2021-08-18 NOTE — PROGRESS NOTES
REASON FOR CONSULTATION:  Follow up evaluation for postsurgical hypothyroidism.    HPI:     Rafaela Rosa is a 50 y.o. female for continued evaluation & treatment of the followin. Postsurgical Hypothyroidism  Initial total thyroidectomy and 38 lymph nodes removed in  [facility] in [place], [place].  Pathology is unknown and these reports were not available during this appointment.  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  Patient reports 1 round of radiation therapy and refused a second round. Patient did have problems with postoperative hypocalcemia, chooses to drink OJ with added Calcium for replacment.     Patient is currently taking levothyroxine 75 micrograms daily and liothyronine 5mg , which has been her dose for over 1 year.  Patient is feeling well.  Energy level has been decreased for a couple of years.  Weight is stable.  No palpitations, no frequent diarrhea, or frequent constipation.  No heat or cold intolerance.  No anterior neck symptoms or problems.   Denies hoarseness or voice changes. Patient denies shortness of breath or dysphagia.         Ref. Range 2021 09:37   TSH Latest Ref Range: 0.380 - 5.330 uIU/mL 0.060 (L)   Free T-4 Latest Ref Range: 0.93 - 1.70 ng/dL 1.05   T3,Free Latest Ref Range: 2.00 - 4.40 pg/mL 2.87   Thyroid Stim Immunoglobulin Latest Ref Range: <=0.54 IU/L <0.10   Thyroglobulin by LC-MC/MS-S/P Latest Ref Range: 1.3 - 31.8 ng/mL Not Applicable   Thyroglobulin Latest Ref Range: 1.3 - 31.8 ng/mL <0.1 (L)   Anti-Thyroglobulin Latest Ref Range: 0.0 - 4.0 IU/mL <0.9     US of Neck 2021: FINDINGS:Thyroid gland is surgically absent. No residual masses within the thyroid bed identified. No adjacent enlarged lymph nodes identified. IMPRESSION: Status post thyroidectomy. No residual mass identified.    2. Vitamin D deficiency  Discontinued vitamin D year ago secondary to fatigue.   No current therapy.       3. Vitamin B12 deficiency  Currently  taking Vitamin B12 1000mcg daily.    Vitamin B supplementation for energy.       4. Perimenopause 09/2020    Menstrual cycle last month, first cycle since 09/2020.  Patient reports hot flashes constantly, always warm, increased moodiness and anxiousness.   Uteral lining and cervical abnormal findings, but negative for cancer.    ROS:      CONS:     No fever, no chills, no weight loss, no fatigue   EYES:      No diplopia, no blurry vision, no redness of eyes, no swelling of eyelids   ENT:    No hearing loss, No ear pain, No sore throat, no dysphagia, no neck swelling   CV:     No chest pain, no palpitations, no claudication, no orthopnea, no PND   PULM:    No SOB, no cough, no hemoptysis, no wheezing    GI:   No nausea, no vomiting, no diarrhea, no constipation, no bloody stools   :  Passing urine well, no dysuria, no hematuria   ENDO:   No polyuria, no polydipsia, no heat intolerance, no cold intolerance   NEURO: No headaches, no dizziness, no convulsions, no tremors   MUSC:  No joint swellings, no arthralgias, no myalgias, no weakness   SKIN:   No rash, no ulcers, no dry skin   PSYCH:   No depression, no anxiety, no difficulty sleeping       Past Medical History:  Patient Active Problem List    Diagnosis Date Noted   • Iron deficiency anemia 04/13/2018   • Acquired hypothyroidism 03/02/2018   • History of thyroid cancer 03/02/2018   • Primary insomnia 03/02/2018       Past Surgical History:  Past Surgical History:   Procedure Laterality Date   • BLOCK EPIDURAL STEROID INJECTION  3/24/2021    Procedure: INJECTION, STEROID, EPIDURAL;  Surgeon: Tato Kyle M.D.;  [facility];  Service: Pain Management   • LUMPECTOMY     • THYROIDECTOMY          Allergies:  Anesthesia s-i-40 [propofol], Boost high protein  [ ], Gabapentin, Gluten meal, and Other food     Current Medications:    Current Outpatient Medications:   •  ZERVIATE 0.24 % Solution, , Disp: , Rfl:   •  cycloSPORINE, PF, (CEQUA)  0.09 % Solution, Administer  into affected eye(s)., Disp: , Rfl:   •  Ascorbic Acid (VITAMIN C) 1000 MG Tab, Take  by mouth., Disp: , Rfl:   •  Cyanocobalamin (VITAMIN B-12) 1000 MCG Tab, Take 1,000 mcg by mouth every day., Disp: , Rfl:   •  tizanidine (ZANAFLEX) 4 MG Tab, Take 1 tablet by mouth at bedtime as needed (muscle spasms)., Disp: 30 tablet, Rfl: 2  •  Epinastine HCl 0.05 % Solution, Administer  into affected eye(s)., Disp: , Rfl:   •  Loteprednol Etabonate (EYSUVIS) 0.25 % Suspension, Administer  into affected eye(s)., Disp: , Rfl:   •  Naproxen Sodium (ALEVE PO), Take 200 mg by mouth 3 times a day., Disp: , Rfl:   •  diclofenac sodium 1 % Gel, Place 3 g on the skin 4 times a day as needed (pain)., Disp: 100 g, Rfl: 3  •  levothyroxine (SYNTHROID) 75 MCG Tab, Take 1 Tab by mouth Every morning on an empty stomach., Disp: 90 Tab, Rfl: 3  •  liothyronine (CYTOMEL) 5 MCG Tab, Take 0.5 Tabs by mouth every day. (Patient taking differently: Take 5 mcg by mouth every day.), Disp: 90 Tab, Rfl: 3  •  ferrous sulfate 325 (65 Fe) MG tablet, Take 325 mg by mouth every day., Disp: , Rfl:     Social History:  Social History     Socioeconomic History   • Marital status:      Spouse name: Not on file   • Number of children: Not on file   • Years of education: Not on file   • Highest education level: Not on file   Occupational History   • Not on file   Tobacco Use   • Smoking status: Never Smoker   • Smokeless tobacco: Never Used   Vaping Use   • Vaping Use: Never used   Substance and Sexual Activity   • Alcohol use: Yes     Comment: 1-2 glasses of wine/weekly   • Drug use: Not on file   • Sexual activity: Not on file   Other Topics Concern   •  Service No   • Blood Transfusions No   • Caffeine Concern No   • Occupational Exposure No   • Hobby Hazards No   • Sleep Concern Yes   • Stress Concern Yes   • Weight Concern No   • Special Diet Yes   • Back Care No   • Exercise Yes   • Bike Helmet No   • Seat Belt  "Yes   • Self-Exams Yes   Social History Narrative   • Not on file     Social Determinants of Health     Financial Resource Strain:    • Difficulty of Paying Living Expenses:    Food Insecurity:    • Worried About Running Out of Food in the Last Year:    • Ran Out of Food in the Last Year:    Transportation Needs:    • Lack of Transportation (Medical):    • Lack of Transportation (Non-Medical):    Physical Activity:    • Days of Exercise per Week:    • Minutes of Exercise per Session:    Stress:    • Feeling of Stress :    Social Connections:    • Frequency of Communication with Friends and Family:    • Frequency of Social Gatherings with Friends and Family:    • Attends Confucianist Services:    • Active Member of Clubs or Organizations:    • Attends Club or Organization Meetings:    • Marital Status:    Intimate Partner Violence:    • Fear of Current or Ex-Partner:    • Emotionally Abused:    • Physically Abused:    • Sexually Abused:         Family History:   Family History   Problem Relation Age of Onset   • Heart Disease Mother    • Cancer Mother    • Cancer Father    • Alcohol/Drug Brother          PHYSICAL EXAM:   Vital signs: /71 (BP Location: Left arm, Patient Position: Sitting, BP Cuff Size: Adult)   Pulse 85   Ht 1.727 m (5' 8\")   Wt 67.8 kg (149 lb 8 oz)   SpO2 98%   BMI 22.73 kg/m²   GENERAL: Well-developed, well-nourished  in no apparent distress.   EYE: No ocular and eyelid asymmetry, Anicteric sclerae,  PERRL, No exophthalmos or lidlag  HENT: Hearing grossly intact, Normocephalic, atraumatic. Pink, moist mucous membranes, No exudate  NECK: Supple. Trachea midline.   CARDIOVASCULAR: Regular rate and rhythm. No murmurs, rubs, or gallops.   LUNGS: Clear to auscultation bilaterally   ABDOMEN: Soft, nontender with positive bowel sounds.   EXTREMITIES: No clubbing, cyanosis, or edema.   NEUROLOGICAL: Cranial nerves II-XII are grossly intact   Symmetric reflexes at the patella no proximal muscle " weakness, No visible tremor with both outstretched hands  LYMPH: No cervical, supraclavicular,  adenopathy palpated.   SKIN: No rashes, lesions. Turgor is normal.    ASSESSMENT/PLAN:     1. History of thyroid cancer  Stable.  US negative for residual thyroid tissue growth.   Repeat US in 2-3 years.       2. Postsurgical hypothyroidism  Stable.  Reduce levothyroxine 75 mcg to 6 days per week and continue Cytomel 5 mcg daily.  Patient is asymptomatic, but TSH doesn't require aggressive suppression. Tumor marker undetectable.     3. Vitamin D deficiency  Stable.  Continue to take vitamin D supplementation as needed.     4. Vitamin B 12 deficiency  Stable.  Continue to take vitamin B12 and B6 daily.    Complete labs before next appointment.   Next appointment in 3-4 months.    Thank you kindly for allowing me to participate in the thyroid care plan for this patient.    Niecy Pedro, APRN  08/18/2021    CC:   Anahi Magallanes M.D.

## 2021-09-02 ENCOUNTER — APPOINTMENT (OUTPATIENT)
Dept: RADIOLOGY | Facility: MEDICAL CENTER | Age: 51
End: 2021-09-02
Attending: FAMILY MEDICINE
Payer: COMMERCIAL

## 2021-09-02 DIAGNOSIS — Z12.31 VISIT FOR SCREENING MAMMOGRAM: ICD-10-CM

## 2021-09-03 ENCOUNTER — OFFICE VISIT (OUTPATIENT)
Dept: MEDICAL GROUP | Facility: MEDICAL CENTER | Age: 51
End: 2021-09-03
Payer: COMMERCIAL

## 2021-09-03 VITALS
HEIGHT: 68 IN | DIASTOLIC BLOOD PRESSURE: 76 MMHG | TEMPERATURE: 97.6 F | OXYGEN SATURATION: 99 % | HEART RATE: 74 BPM | BODY MASS INDEX: 21.22 KG/M2 | SYSTOLIC BLOOD PRESSURE: 110 MMHG | WEIGHT: 140 LBS | RESPIRATION RATE: 16 BRPM

## 2021-09-03 DIAGNOSIS — M54.12 CERVICAL RADICULOPATHY: ICD-10-CM

## 2021-09-03 DIAGNOSIS — F43.9 STRESS AT HOME: ICD-10-CM

## 2021-09-03 DIAGNOSIS — E03.9 ACQUIRED HYPOTHYROIDISM: ICD-10-CM

## 2021-09-03 DIAGNOSIS — M50.30 DDD (DEGENERATIVE DISC DISEASE), CERVICAL: ICD-10-CM

## 2021-09-03 DIAGNOSIS — Z13.6 SCREENING FOR CARDIOVASCULAR CONDITION: ICD-10-CM

## 2021-09-03 DIAGNOSIS — D50.9 IRON DEFICIENCY ANEMIA, UNSPECIFIED IRON DEFICIENCY ANEMIA TYPE: ICD-10-CM

## 2021-09-03 DIAGNOSIS — Z85.850 HISTORY OF THYROID CANCER: ICD-10-CM

## 2021-09-03 DIAGNOSIS — M54.16 LUMBAR RADICULOPATHY: ICD-10-CM

## 2021-09-03 PROCEDURE — 99214 OFFICE O/P EST MOD 30 MIN: CPT | Performed by: FAMILY MEDICINE

## 2021-09-03 ASSESSMENT — FIBROSIS 4 INDEX: FIB4 SCORE: 1.14

## 2021-09-03 NOTE — PROGRESS NOTES
"  Subjective:     Rafaela Rosa is a 50 y.o. female presenting for a follow-up. She continues to deal with symptoms related to her lumbar and cervical radiculopathy, degenerative disc disease, carpal tunnel. She saw physiatry, received injections. She was then referred to neurosurgery. She would like a 2nd opinion. Is also wondering about acupuncture.        Current Outpatient Medications:   •  ZERVIATE 0.24 % Solution, , Disp: , Rfl:   •  cycloSPORINE, PF, (CEQUA) 0.09 % Solution, Administer  into affected eye(s)., Disp: , Rfl:   •  Ascorbic Acid (VITAMIN C) 1000 MG Tab, Take  by mouth., Disp: , Rfl:   •  Cyanocobalamin (VITAMIN B-12) 1000 MCG Tab, Take 1,000 mcg by mouth every day., Disp: , Rfl:   •  Epinastine HCl 0.05 % Solution, Administer  into affected eye(s)., Disp: , Rfl:   •  Loteprednol Etabonate (EYSUVIS) 0.25 % Suspension, Administer  into affected eye(s)., Disp: , Rfl:   •  Naproxen Sodium (ALEVE PO), Take 200 mg by mouth 3 times a day., Disp: , Rfl:   •  diclofenac sodium 1 % Gel, Place 3 g on the skin 4 times a day as needed (pain)., Disp: 100 g, Rfl: 3  •  levothyroxine (SYNTHROID) 75 MCG Tab, Take 1 Tab by mouth Every morning on an empty stomach., Disp: 90 Tab, Rfl: 3  •  liothyronine (CYTOMEL) 5 MCG Tab, Take 0.5 Tabs by mouth every day. (Patient taking differently: Take 5 mcg by mouth every day.), Disp: 90 Tab, Rfl: 3  •  ferrous sulfate 325 (65 Fe) MG tablet, Take 325 mg by mouth every day., Disp: , Rfl:     Objective:     Vitals: /76   Pulse 74   Temp 36.4 °C (97.6 °F)   Resp 16   Ht 1.727 m (5' 8\")   Wt 63.5 kg (140 lb)   SpO2 99%   BMI 21.29 kg/m²   General: Alert  HEENT: Normocephalic.     Assessment/Plan:     Rafaela was seen today for follow-up.    Diagnoses and all orders for this visit:    Lumbar radiculopathy  DDD (degenerative disc disease), cervical  Cervical radiculopathy  Chronic, worsening. New referral to neurosurgery placed for 2nd opinion.  -     REFERRAL " TO NEUROSURGERY    Stress at home  Chronic, worsening. Referral for marriage counseling placed  -     REFERRAL TO PSYCHOLOGY    Iron deficiency anemia, unspecified iron deficiency anemia type  Chronic, stable, continue to monitor  -     CBC WITH DIFFERENTIAL; Future  -     Comp Metabolic Panel; Future  -     FERRITIN; Future  -     IRON/TOTAL IRON BIND; Future    History of thyroid cancer  Acquired hypothyroidism  Chronic, Stable, managed by endocrinology    Screening for cardiovascular condition  -     Lipid Profile; Future

## 2021-09-28 ENCOUNTER — HOSPITAL ENCOUNTER (OUTPATIENT)
Dept: LAB | Facility: MEDICAL CENTER | Age: 51
End: 2021-09-28
Attending: FAMILY MEDICINE
Payer: COMMERCIAL

## 2021-09-28 DIAGNOSIS — D50.9 IRON DEFICIENCY ANEMIA, UNSPECIFIED IRON DEFICIENCY ANEMIA TYPE: ICD-10-CM

## 2021-09-28 DIAGNOSIS — Z13.6 SCREENING FOR CARDIOVASCULAR CONDITION: ICD-10-CM

## 2021-09-28 LAB
ALBUMIN SERPL BCP-MCNC: 4.5 G/DL (ref 3.2–4.9)
ALBUMIN/GLOB SERPL: 1.7 G/DL
ALP SERPL-CCNC: 38 U/L (ref 30–99)
ALT SERPL-CCNC: 13 U/L (ref 2–50)
ANION GAP SERPL CALC-SCNC: 8 MMOL/L (ref 7–16)
AST SERPL-CCNC: 12 U/L (ref 12–45)
BASOPHILS # BLD AUTO: 0.9 % (ref 0–1.8)
BASOPHILS # BLD: 0.04 K/UL (ref 0–0.12)
BILIRUB SERPL-MCNC: 0.7 MG/DL (ref 0.1–1.5)
BUN SERPL-MCNC: 12 MG/DL (ref 8–22)
CALCIUM SERPL-MCNC: 9.6 MG/DL (ref 8.5–10.5)
CHLORIDE SERPL-SCNC: 104 MMOL/L (ref 96–112)
CHOLEST SERPL-MCNC: 161 MG/DL (ref 100–199)
CO2 SERPL-SCNC: 26 MMOL/L (ref 20–33)
CREAT SERPL-MCNC: 0.83 MG/DL (ref 0.5–1.4)
EOSINOPHIL # BLD AUTO: 0.19 K/UL (ref 0–0.51)
EOSINOPHIL NFR BLD: 4 % (ref 0–6.9)
ERYTHROCYTE [DISTWIDTH] IN BLOOD BY AUTOMATED COUNT: 48.6 FL (ref 35.9–50)
FASTING STATUS PATIENT QL REPORTED: NORMAL
FERRITIN SERPL-MCNC: 65.1 NG/ML (ref 10–291)
GLOBULIN SER CALC-MCNC: 2.6 G/DL (ref 1.9–3.5)
GLUCOSE SERPL-MCNC: 90 MG/DL (ref 65–99)
HCT VFR BLD AUTO: 43.2 % (ref 37–47)
HDLC SERPL-MCNC: 100 MG/DL
HGB BLD-MCNC: 14.2 G/DL (ref 12–16)
IMM GRANULOCYTES # BLD AUTO: 0.01 K/UL (ref 0–0.11)
IMM GRANULOCYTES NFR BLD AUTO: 0.2 % (ref 0–0.9)
IRON SATN MFR SERPL: 40 % (ref 15–55)
IRON SERPL-MCNC: 119 UG/DL (ref 40–170)
LDLC SERPL CALC-MCNC: 52 MG/DL
LYMPHOCYTES # BLD AUTO: 1.31 K/UL (ref 1–4.8)
LYMPHOCYTES NFR BLD: 27.9 % (ref 22–41)
MCH RBC QN AUTO: 31.8 PG (ref 27–33)
MCHC RBC AUTO-ENTMCNC: 32.9 G/DL (ref 33.6–35)
MCV RBC AUTO: 96.6 FL (ref 81.4–97.8)
MONOCYTES # BLD AUTO: 0.45 K/UL (ref 0–0.85)
MONOCYTES NFR BLD AUTO: 9.6 % (ref 0–13.4)
NEUTROPHILS # BLD AUTO: 2.7 K/UL (ref 2–7.15)
NEUTROPHILS NFR BLD: 57.4 % (ref 44–72)
NRBC # BLD AUTO: 0 K/UL
NRBC BLD-RTO: 0 /100 WBC
PLATELET # BLD AUTO: 228 K/UL (ref 164–446)
PMV BLD AUTO: 9.4 FL (ref 9–12.9)
POTASSIUM SERPL-SCNC: 3.9 MMOL/L (ref 3.6–5.5)
PROT SERPL-MCNC: 7.1 G/DL (ref 6–8.2)
RBC # BLD AUTO: 4.47 M/UL (ref 4.2–5.4)
SODIUM SERPL-SCNC: 138 MMOL/L (ref 135–145)
TIBC SERPL-MCNC: 296 UG/DL (ref 250–450)
TRIGL SERPL-MCNC: 43 MG/DL (ref 0–149)
UIBC SERPL-MCNC: 177 UG/DL (ref 110–370)
WBC # BLD AUTO: 4.7 K/UL (ref 4.8–10.8)

## 2021-09-28 PROCEDURE — 83540 ASSAY OF IRON: CPT

## 2021-09-28 PROCEDURE — 85025 COMPLETE CBC W/AUTO DIFF WBC: CPT

## 2021-09-28 PROCEDURE — 82728 ASSAY OF FERRITIN: CPT

## 2021-09-28 PROCEDURE — 36415 COLL VENOUS BLD VENIPUNCTURE: CPT

## 2021-09-28 PROCEDURE — 83550 IRON BINDING TEST: CPT

## 2021-09-28 PROCEDURE — 80053 COMPREHEN METABOLIC PANEL: CPT

## 2021-09-28 PROCEDURE — 80061 LIPID PANEL: CPT

## 2021-10-14 ENCOUNTER — HOSPITAL ENCOUNTER (OUTPATIENT)
Dept: RADIOLOGY | Facility: MEDICAL CENTER | Age: 51
End: 2021-10-14
Attending: FAMILY MEDICINE
Payer: COMMERCIAL

## 2021-10-14 DIAGNOSIS — Z12.31 VISIT FOR SCREENING MAMMOGRAM: ICD-10-CM

## 2021-10-14 PROCEDURE — 77063 BREAST TOMOSYNTHESIS BI: CPT

## 2021-10-22 DIAGNOSIS — E03.9 ACQUIRED HYPOTHYROIDISM: ICD-10-CM

## 2021-10-22 RX ORDER — LIOTHYRONINE SODIUM 5 UG/1
2.5 TABLET ORAL DAILY
Qty: 90 TABLET | Refills: 3 | Status: CANCELLED | OUTPATIENT
Start: 2021-10-22

## 2021-10-22 RX ORDER — LIOTHYRONINE SODIUM 5 UG/1
5 TABLET ORAL DAILY
Qty: 90 TABLET | Refills: 0 | Status: SHIPPED | OUTPATIENT
Start: 2021-10-22 | End: 2022-01-31 | Stop reason: SDUPTHER

## 2021-10-22 NOTE — PROGRESS NOTES
Have refilled her Cytomel 5 mcg daily-patient is pending does follow-up with endocrinology however her appointment is not until January.  Have referenced the August 2021 note from endocrinology and sent over Cytomel 5 mcg daily.

## 2022-01-26 ENCOUNTER — APPOINTMENT (OUTPATIENT)
Dept: ENDOCRINOLOGY | Facility: MEDICAL CENTER | Age: 52
End: 2022-01-26
Attending: NURSE PRACTITIONER
Payer: COMMERCIAL

## 2022-01-31 ENCOUNTER — PATIENT MESSAGE (OUTPATIENT)
Dept: ENDOCRINOLOGY | Facility: MEDICAL CENTER | Age: 52
End: 2022-01-31

## 2022-01-31 DIAGNOSIS — E03.9 ACQUIRED HYPOTHYROIDISM: ICD-10-CM

## 2022-02-01 RX ORDER — LIOTHYRONINE SODIUM 5 UG/1
5 TABLET ORAL DAILY
Qty: 90 TABLET | Refills: 0 | Status: SHIPPED | OUTPATIENT
Start: 2022-02-01 | End: 2022-05-04 | Stop reason: SDUPTHER

## 2022-02-01 RX ORDER — LEVOTHYROXINE SODIUM 0.07 MG/1
75 TABLET ORAL
Qty: 90 TABLET | Refills: 3 | Status: SHIPPED | OUTPATIENT
Start: 2022-02-01 | End: 2022-05-04 | Stop reason: SDUPTHER

## 2022-04-06 ENCOUNTER — OFFICE VISIT (OUTPATIENT)
Dept: ENDOCRINOLOGY | Facility: MEDICAL CENTER | Age: 52
End: 2022-04-06
Attending: NURSE PRACTITIONER
Payer: COMMERCIAL

## 2022-04-06 VITALS
OXYGEN SATURATION: 92 % | BODY MASS INDEX: 22.28 KG/M2 | SYSTOLIC BLOOD PRESSURE: 110 MMHG | DIASTOLIC BLOOD PRESSURE: 72 MMHG | HEART RATE: 78 BPM | HEIGHT: 68 IN | WEIGHT: 147 LBS

## 2022-04-06 DIAGNOSIS — C73 PAPILLARY THYROID CARCINOMA (HCC): ICD-10-CM

## 2022-04-06 DIAGNOSIS — E89.0 POSTSURGICAL HYPOTHYROIDISM: ICD-10-CM

## 2022-04-06 DIAGNOSIS — E55.9 VITAMIN D DEFICIENCY: ICD-10-CM

## 2022-04-06 DIAGNOSIS — E53.8 VITAMIN B 12 DEFICIENCY: ICD-10-CM

## 2022-04-06 PROBLEM — M54.12 CERVICAL RADICULOPATHY: Status: ACTIVE | Noted: 2022-04-06

## 2022-04-06 PROBLEM — M54.2 CERVICALGIA: Status: ACTIVE | Noted: 2022-04-06

## 2022-04-06 PROCEDURE — 99211 OFF/OP EST MAY X REQ PHY/QHP: CPT | Performed by: NURSE PRACTITIONER

## 2022-04-06 PROCEDURE — 99214 OFFICE O/P EST MOD 30 MIN: CPT | Performed by: NURSE PRACTITIONER

## 2022-04-06 RX ORDER — LIOTHYRONINE SODIUM 100 %
POWDER (GRAM) MISCELLANEOUS
COMMUNITY
End: 2022-07-01

## 2022-04-06 RX ORDER — TIZANIDINE 4 MG
KIT MISCELLANEOUS
COMMUNITY
End: 2022-07-01

## 2022-04-06 ASSESSMENT — FIBROSIS 4 INDEX: FIB4 SCORE: 0.77

## 2022-04-06 NOTE — PROGRESS NOTES
CHIEF COMPLAINT:  Follow up evaluation for postsurgical hypothyroidism.    HPI:     Rafalea Rosa is a 50 y.o. female for continued evaluation & treatment of the followin. Postsurgical Hypothyroidism  Initial total thyroidectomy and 38 lymph nodes removed in  [facility] in [place], [place].  Pathology is unknown and these reports were not available during this appointment. 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  Patient reports 1 round of radiation therapy and refused a second round. Patient did have problems with postoperative hypocalcemia, chooses to drink OJ with added Calcium for replacment.     Patient is currently taking levothyroxine 75 micrograms 6 days a week and liothyronine 5mg , which has been her dose for over 1 year.  Patient is feeling well.  Energy level has been decreased for a couple of years.  Weight is stable.  No palpitations, no frequent diarrhea, or frequent constipation.  No heat or cold intolerance.  No anterior neck symptoms or problems.   Denies hoarseness or voice changes. Patient denies shortness of breath or dysphagia.        Current thyroid lab assay.     Ref. Range 2021 09:37   TSH Latest Ref Range: 0.380 - 5.330 uIU/mL 0.060 (L)   Free T-4 Latest Ref Range: 0.93 - 1.70 ng/dL 1.05   T3,Free Latest Ref Range: 2.00 - 4.40 pg/mL 2.87   Thyroid Stim Immunoglobulin Latest Ref Range: <=0.54 IU/L <0.10   Thyroglobulin by LC-MC/MS-S/P Latest Ref Range: 1.3 - 31.8 ng/mL Not Applicable   Thyroglobulin Latest Ref Range: 1.3 - 31.8 ng/mL <0.1 (L)   Anti-Thyroglobulin Latest Ref Range: 0.0 - 4.0 IU/mL <0.9     US of Neck 2021: FINDINGS:Thyroid gland is surgically absent. No residual masses within the thyroid bed identified. No adjacent enlarged lymph nodes identified. IMPRESSION: Status post thyroidectomy. No residual mass identified.    2. Vitamin D deficiency  Patient currently taking vitamin D3 5000 IU daily.      3. Vitamin B12 deficiency  Currently  taking Vitamin B12 and B6 to increase her energy level.      4. Perimenopause 09/2020    Last menstrual cycle was in 08/20/2021.    Patient reports hot flashes constantly, always warm, increased moodiness and anxiousness.   She is discussing possible hormone therapy to reduce symptoms with her gynecologist.  She reports she has had 2 abnormal Pap smears in the last year however they both have been negative for cancer related pathology..         ROS:      CONS:     No fever, no chills, no weight loss, no fatigue   EYES:      No diplopia, no blurry vision, no redness of eyes, no swelling of eyelids   ENT:    No hearing loss, No ear pain, No sore throat, no dysphagia, no neck swelling   CV:     No chest pain, no palpitations, no claudication, no orthopnea, no PND   PULM:    No SOB, no cough, no hemoptysis, no wheezing    GI:   No nausea, no vomiting, no diarrhea, no constipation, no bloody stools   :  Passing urine well, no dysuria, no hematuria   ENDO:   No polyuria, no polydipsia, no heat intolerance, no cold intolerance   NEURO: No headaches, no dizziness, no convulsions, no tremors   MUSC:  No joint swellings, no arthralgias, no myalgias, no weakness   SKIN:   No rash, no ulcers, no dry skin   PSYCH:   No depression, no anxiety, no difficulty sleeping       Past Medical History:  Patient Active Problem List    Diagnosis Date Noted   • Iron deficiency anemia 04/13/2018   • Acquired hypothyroidism 03/02/2018   • History of thyroid cancer 03/02/2018   • Primary insomnia 03/02/2018       Past Surgical History:  Past Surgical History:   Procedure Laterality Date   • BLOCK EPIDURAL STEROID INJECTION  3/24/2021    Procedure: INJECTION, STEROID, EPIDURAL;  Surgeon: Tato Kyle M.D.;  [facility];  Service: Pain Management   • LUMPECTOMY     • THYROIDECTOMY          Allergies:  Anesthesia s-i-40 [propofol], Boost high protein  [ ], Gabapentin, Gluten meal, and Other food     Current  Medications:    Current Outpatient Medications:   •  levothyroxine (SYNTHROID) 75 MCG Tab, Take 1 Tablet by mouth every morning on an empty stomach., Disp: 90 Tablet, Rfl: 3  •  liothyronine (CYTOMEL) 5 MCG Tab, Take 1 Tablet by mouth every day., Disp: 90 Tablet, Rfl: 0  •  ZERVIATE 0.24 % Solution, , Disp: , Rfl:   •  cycloSPORINE, PF, (CEQUA) 0.09 % Solution, Administer  into affected eye(s)., Disp: , Rfl:   •  Ascorbic Acid (VITAMIN C) 1000 MG Tab, Take  by mouth., Disp: , Rfl:   •  Cyanocobalamin (VITAMIN B-12) 1000 MCG Tab, Take 1,000 mcg by mouth every day., Disp: , Rfl:   •  Epinastine HCl 0.05 % Solution, Administer  into affected eye(s)., Disp: , Rfl:   •  Loteprednol Etabonate (EYSUVIS) 0.25 % Suspension, Administer  into affected eye(s)., Disp: , Rfl:   •  Naproxen Sodium (ALEVE PO), Take 200 mg by mouth 3 times a day., Disp: , Rfl:   •  diclofenac sodium 1 % Gel, Place 3 g on the skin 4 times a day as needed (pain)., Disp: 100 g, Rfl: 3  •  ferrous sulfate 325 (65 Fe) MG tablet, Take 325 mg by mouth every day., Disp: , Rfl:     Social History:  Social History     Socioeconomic History   • Marital status:      Spouse name: Not on file   • Number of children: Not on file   • Years of education: Not on file   • Highest education level: Not on file   Occupational History   • Not on file   Tobacco Use   • Smoking status: Never Smoker   • Smokeless tobacco: Never Used   Vaping Use   • Vaping Use: Never used   Substance and Sexual Activity   • Alcohol use: Yes     Comment: 1-2 glasses of wine/weekly   • Drug use: Not on file   • Sexual activity: Not on file   Other Topics Concern   •  Service No   • Blood Transfusions No   • Caffeine Concern No   • Occupational Exposure No   • Hobby Hazards No   • Sleep Concern Yes   • Stress Concern Yes   • Weight Concern No   • Special Diet Yes   • Back Care No   • Exercise Yes   • Bike Helmet No   • Seat Belt Yes   • Self-Exams Yes   Social History Narrative  "  • Not on file     Social Determinants of Health     Financial Resource Strain: Not on file   Food Insecurity: Not on file   Transportation Needs: Not on file   Physical Activity: Not on file   Stress: Not on file   Social Connections: Not on file   Intimate Partner Violence: Not on file   Housing Stability: Not on file        Family History:   Family History   Problem Relation Age of Onset   • Heart Disease Mother    • Cancer Mother    • Cancer Father    • Alcohol/Drug Brother          PHYSICAL EXAM:   Vital signs: /72 (BP Location: Right arm, Patient Position: Sitting, BP Cuff Size: Adult)   Pulse 78   Ht 1.715 m (5' 7.5\")   Wt 66.7 kg (147 lb)   SpO2 92%   BMI 22.68 kg/m²   GENERAL: Well-developed, well-nourished  in no apparent distress.   EYE: No ocular and eyelid asymmetry, Anicteric sclerae,  PERRL, No exophthalmos or lidlag  HENT: Hearing grossly intact, Normocephalic, atraumatic. Pink, moist mucous membranes, No exudate  NECK: Supple. Trachea midline.   CARDIOVASCULAR: Regular rate and rhythm. No murmurs, rubs, or gallops.   LUNGS: Clear to auscultation bilaterally   ABDOMEN: Soft, nontender with positive bowel sounds.   EXTREMITIES: No clubbing, cyanosis, or edema.   NEUROLOGICAL: Cranial nerves II-XII are grossly intact   Symmetric reflexes at the patella no proximal muscle weakness, No visible tremor with both outstretched hands  LYMPH: No cervical, supraclavicular,  adenopathy palpated.   SKIN: No rashes, lesions. Turgor is normal.    ASSESSMENT/PLAN:     1.  Papillary thyroid cancer  Stable.  US negative for residual thyroid tissue growth.   Repeat US in 2-3 years.       2. Postsurgical hypothyroidism  Stable.  Continue taking levothyroxine 75 mcg to 6 days per week and continue Cytomel 5 mcg daily.  Patient is asymptomatic, but TSH doesn't require aggressive suppression. Tumor marker undetectable.   Complete labs now.    3. Vitamin D deficiency  Stable.  Continue to monitor.     4. Vitamin " B 12 deficiency  Stable.  No  Continue to take vitamin D supplementation as needed.    Complete labs before next appointment.   Next appointment in 3-4 months.    Thank you kindly for allowing me to participate in the thyroid care plan for this patient.    Niecy Pedro, APRN  04/06/2022    CC:   Anahi Magallanes M.D.

## 2022-04-21 ENCOUNTER — HOSPITAL ENCOUNTER (OUTPATIENT)
Dept: LAB | Facility: MEDICAL CENTER | Age: 52
End: 2022-04-21
Attending: NURSE PRACTITIONER
Payer: COMMERCIAL

## 2022-04-21 DIAGNOSIS — Z85.850 HISTORY OF THYROID CANCER: ICD-10-CM

## 2022-04-21 LAB
25(OH)D3 SERPL-MCNC: 71 NG/ML (ref 30–100)
T3FREE SERPL-MCNC: 3.5 PG/ML (ref 2–4.4)
T4 FREE SERPL-MCNC: 1.14 NG/DL (ref 0.93–1.7)
TSH SERPL DL<=0.005 MIU/L-ACNC: 0.17 UIU/ML (ref 0.38–5.33)

## 2022-04-21 PROCEDURE — 84481 FREE ASSAY (FT-3): CPT

## 2022-04-21 PROCEDURE — 82306 VITAMIN D 25 HYDROXY: CPT

## 2022-04-21 PROCEDURE — 84443 ASSAY THYROID STIM HORMONE: CPT

## 2022-04-21 PROCEDURE — 84432 ASSAY OF THYROGLOBULIN: CPT

## 2022-04-21 PROCEDURE — 84439 ASSAY OF FREE THYROXINE: CPT

## 2022-04-21 PROCEDURE — 36415 COLL VENOUS BLD VENIPUNCTURE: CPT

## 2022-04-21 PROCEDURE — 86800 THYROGLOBULIN ANTIBODY: CPT

## 2022-05-04 ENCOUNTER — PATIENT MESSAGE (OUTPATIENT)
Dept: ENDOCRINOLOGY | Facility: MEDICAL CENTER | Age: 52
End: 2022-05-04
Payer: COMMERCIAL

## 2022-05-04 DIAGNOSIS — E03.9 ACQUIRED HYPOTHYROIDISM: ICD-10-CM

## 2022-05-05 RX ORDER — LIOTHYRONINE SODIUM 5 UG/1
5 TABLET ORAL DAILY
Qty: 90 TABLET | Refills: 0 | Status: SHIPPED | OUTPATIENT
Start: 2022-05-05 | End: 2022-07-26 | Stop reason: SDUPTHER

## 2022-05-05 RX ORDER — LEVOTHYROXINE SODIUM 0.07 MG/1
75 TABLET ORAL
Qty: 90 TABLET | Refills: 3 | Status: SHIPPED | OUTPATIENT
Start: 2022-05-05 | End: 2022-07-26 | Stop reason: SDUPTHER

## 2022-07-01 ENCOUNTER — PRE-ADMISSION TESTING (OUTPATIENT)
Dept: ADMISSIONS | Facility: MEDICAL CENTER | Age: 52
End: 2022-07-01
Attending: ORTHOPAEDIC SURGERY
Payer: COMMERCIAL

## 2022-07-01 RX ORDER — ANTIARTHRITIC COMBINATION NO.2 900 MG
1 TABLET ORAL DAILY
COMMUNITY

## 2022-07-01 RX ORDER — IBUPROFEN 200 MG
400-600 TABLET ORAL EVERY 6 HOURS PRN
COMMUNITY
End: 2023-06-29

## 2022-07-01 NOTE — PREPROCEDURE INSTRUCTIONS
Pre-admit appointment completed.  Pt instructed to continue regularly prescribed medications through the day before surgery. Pt instructed to take the following medications the day of surgery with a sip of water, per anesthesia protocol; Epenastine, eysuvis, cytomel, synthroid, and if she needs her other eye drops.     Pt is going to verify with Dr Villa if she can continue some of her supplements due to her severe dry eye syndrome.     MET's=>4.

## 2022-11-30 ENCOUNTER — APPOINTMENT (OUTPATIENT)
Dept: RADIOLOGY | Facility: MEDICAL CENTER | Age: 52
End: 2022-11-30
Attending: FAMILY MEDICINE
Payer: COMMERCIAL

## 2022-11-30 DIAGNOSIS — Z12.31 VISIT FOR SCREENING MAMMOGRAM: ICD-10-CM

## 2023-02-08 ENCOUNTER — PATIENT MESSAGE (OUTPATIENT)
Dept: MEDICAL GROUP | Facility: MEDICAL CENTER | Age: 53
End: 2023-02-08
Payer: COMMERCIAL

## 2023-02-08 DIAGNOSIS — Z85.850 HISTORY OF THYROID CANCER: ICD-10-CM

## 2023-02-08 DIAGNOSIS — E03.9 ACQUIRED HYPOTHYROIDISM: ICD-10-CM

## 2023-02-10 RX ORDER — LIOTHYRONINE SODIUM 5 UG/1
5 TABLET ORAL DAILY
Qty: 90 TABLET | Refills: 0 | Status: SHIPPED | OUTPATIENT
Start: 2023-02-10 | End: 2023-05-19 | Stop reason: SDUPTHER

## 2023-03-07 ENCOUNTER — APPOINTMENT (OUTPATIENT)
Dept: RADIOLOGY | Facility: MEDICAL CENTER | Age: 53
End: 2023-03-07
Attending: FAMILY MEDICINE
Payer: COMMERCIAL

## 2023-03-07 ENCOUNTER — PATIENT MESSAGE (OUTPATIENT)
Dept: MEDICAL GROUP | Facility: MEDICAL CENTER | Age: 53
End: 2023-03-07
Payer: COMMERCIAL

## 2023-03-07 DIAGNOSIS — Z85.850 HISTORY OF THYROID CANCER: ICD-10-CM

## 2023-03-07 DIAGNOSIS — Z12.31 VISIT FOR SCREENING MAMMOGRAM: ICD-10-CM

## 2023-03-07 DIAGNOSIS — E03.9 ACQUIRED HYPOTHYROIDISM: ICD-10-CM

## 2023-03-14 ENCOUNTER — APPOINTMENT (OUTPATIENT)
Dept: RADIOLOGY | Facility: MEDICAL CENTER | Age: 53
End: 2023-03-14
Attending: FAMILY MEDICINE
Payer: COMMERCIAL

## 2023-03-14 DIAGNOSIS — Z12.31 VISIT FOR SCREENING MAMMOGRAM: ICD-10-CM

## 2023-03-29 ENCOUNTER — HOSPITAL ENCOUNTER (OUTPATIENT)
Dept: RADIOLOGY | Facility: MEDICAL CENTER | Age: 53
End: 2023-03-29
Attending: ORTHOPAEDIC SURGERY
Payer: COMMERCIAL

## 2023-03-29 DIAGNOSIS — M79.672 LEFT FOOT PAIN: ICD-10-CM

## 2023-03-29 PROCEDURE — 73720 MRI LWR EXTREMITY W/O&W/DYE: CPT | Mod: LT

## 2023-03-29 PROCEDURE — A9579 GAD-BASE MR CONTRAST NOS,1ML: HCPCS | Performed by: ORTHOPAEDIC SURGERY

## 2023-03-29 PROCEDURE — 700117 HCHG RX CONTRAST REV CODE 255: Performed by: ORTHOPAEDIC SURGERY

## 2023-03-29 RX ADMIN — GADOTERIDOL 15 ML: 279.3 INJECTION, SOLUTION INTRAVENOUS at 16:18

## 2023-05-19 DIAGNOSIS — E03.9 ACQUIRED HYPOTHYROIDISM: ICD-10-CM

## 2023-05-19 RX ORDER — LIOTHYRONINE SODIUM 5 UG/1
5 TABLET ORAL DAILY
Qty: 90 TABLET | Refills: 0 | Status: SHIPPED | OUTPATIENT
Start: 2023-05-19 | End: 2023-06-29 | Stop reason: SDUPTHER

## 2023-05-22 ENCOUNTER — TELEPHONE (OUTPATIENT)
Dept: ENDOCRINOLOGY | Facility: MEDICAL CENTER | Age: 53
End: 2023-05-22

## 2023-05-22 DIAGNOSIS — E03.9 ACQUIRED HYPOTHYROIDISM: ICD-10-CM

## 2023-05-22 RX ORDER — LEVOTHYROXINE SODIUM 0.07 MG/1
75 TABLET ORAL
Qty: 90 TABLET | Refills: 0 | Status: SHIPPED | OUTPATIENT
Start: 2023-05-22 | End: 2023-06-29 | Stop reason: SDUPTHER

## 2023-05-22 NOTE — TELEPHONE ENCOUNTER
Received request via: Pharmacy    Was the patient seen in the last year in this department? No    Does the patient have an active prescription (recently filled or refills available) for medication(s) requested? No    Does the patient have [facility] and need 100 day supply (blood pressure, diabetes and cholesterol meds only)? Patient does not have SCP

## 2023-06-02 ENCOUNTER — HOSPITAL ENCOUNTER (OUTPATIENT)
Facility: MEDICAL CENTER | Age: 53
End: 2023-06-02
Payer: COMMERCIAL

## 2023-06-02 ENCOUNTER — OFFICE VISIT (OUTPATIENT)
Dept: URGENT CARE | Facility: CLINIC | Age: 53
End: 2023-06-02
Payer: COMMERCIAL

## 2023-06-02 VITALS
HEART RATE: 98 BPM | BODY MASS INDEX: 21.66 KG/M2 | HEIGHT: 67 IN | RESPIRATION RATE: 20 BRPM | OXYGEN SATURATION: 95 % | WEIGHT: 138 LBS | SYSTOLIC BLOOD PRESSURE: 102 MMHG | DIASTOLIC BLOOD PRESSURE: 84 MMHG | TEMPERATURE: 99.5 F

## 2023-06-02 DIAGNOSIS — K52.9 GASTROENTERITIS: ICD-10-CM

## 2023-06-02 DIAGNOSIS — N30.01 ACUTE CYSTITIS WITH HEMATURIA: ICD-10-CM

## 2023-06-02 DIAGNOSIS — R35.0 URINARY FREQUENCY: ICD-10-CM

## 2023-06-02 DIAGNOSIS — R39.15 URINARY URGENCY: ICD-10-CM

## 2023-06-02 LAB
APPEARANCE UR: NORMAL
BILIRUB UR STRIP-MCNC: NORMAL MG/DL
COLOR UR AUTO: NORMAL
GLUCOSE UR STRIP.AUTO-MCNC: NORMAL MG/DL
KETONES UR STRIP.AUTO-MCNC: NORMAL MG/DL
LEUKOCYTE ESTERASE UR QL STRIP.AUTO: NORMAL
NITRITE UR QL STRIP.AUTO: NORMAL
PH UR STRIP.AUTO: 5.5 [PH] (ref 5–8)
PROT UR QL STRIP: 100 MG/DL
RBC UR QL AUTO: NORMAL
SP GR UR STRIP.AUTO: 1.01
UROBILINOGEN UR STRIP-MCNC: 0.2 MG/DL

## 2023-06-02 PROCEDURE — 3074F SYST BP LT 130 MM HG: CPT

## 2023-06-02 PROCEDURE — 87186 SC STD MICRODIL/AGAR DIL: CPT

## 2023-06-02 PROCEDURE — 87086 URINE CULTURE/COLONY COUNT: CPT

## 2023-06-02 PROCEDURE — 81002 URINALYSIS NONAUTO W/O SCOPE: CPT

## 2023-06-02 PROCEDURE — 3079F DIAST BP 80-89 MM HG: CPT

## 2023-06-02 PROCEDURE — 99213 OFFICE O/P EST LOW 20 MIN: CPT

## 2023-06-02 PROCEDURE — 87077 CULTURE AEROBIC IDENTIFY: CPT

## 2023-06-02 RX ORDER — CIPROFLOXACIN 500 MG/1
500 TABLET, FILM COATED ORAL EVERY 12 HOURS
Qty: 14 TABLET | Refills: 0 | Status: SHIPPED | OUTPATIENT
Start: 2023-06-02 | End: 2023-06-09

## 2023-06-02 ASSESSMENT — ENCOUNTER SYMPTOMS
SHORTNESS OF BREATH: 0
WHEEZING: 0
FLANK PAIN: 1
HEADACHES: 1
SORE THROAT: 0
DIARRHEA: 1
SINUS PAIN: 0
FEVER: 1
ABDOMINAL PAIN: 1
NAUSEA: 0
COUGH: 0
VOMITING: 0

## 2023-06-02 ASSESSMENT — FIBROSIS 4 INDEX: FIB4 SCORE: 0.79

## 2023-06-02 NOTE — PATIENT INSTRUCTIONS
Urinary Tract Infection, Adult  A urinary tract infection (UTI) is an infection of any part of the urinary tract. The urinary tract includes:  The kidneys.  The ureters.  The bladder.  The urethra.  These organs make, store, and get rid of pee (urine) in the body.  What are the causes?  This is caused by germs (bacteria) in your genital area. These germs grow and cause swelling (inflammation) of your urinary tract.  What increases the risk?  You are more likely to develop this condition if:  You have a small, thin tube (catheter) to drain pee.  You cannot control when you pee or poop (incontinence).  You are female, and:  You use these methods to prevent pregnancy:  A medicine that kills sperm (spermicide).  A device that blocks sperm (diaphragm).  You have low levels of a female hormone (estrogen).  You are pregnant.  You have genes that add to your risk.  You are sexually active.  You take antibiotic medicines.  You have trouble peeing because of:  A prostate that is bigger than normal, if you are male.  A blockage in the part of your body that drains pee from the bladder (urethra).  A kidney stone.  A nerve condition that affects your bladder (neurogenic bladder).  Not getting enough to drink.  Not peeing often enough.  You have other conditions, such as:  Diabetes.  A weak disease-fighting system (immune system).  Sickle cell disease.  Gout.  Injury of the spine.  What are the signs or symptoms?  Symptoms of this condition include:  Needing to pee right away (urgently).  Peeing often.  Peeing small amounts often.  Pain or burning when peeing.  Blood in the pee.  Pee that smells bad or not like normal.  Trouble peeing.  Pee that is cloudy.  Fluid coming from the vagina, if you are female.  Pain in the belly or lower back.  Other symptoms include:  Throwing up (vomiting).  No urge to eat.  Feeling mixed up (confused).  Being tired and grouchy (irritable).  A fever.  Watery poop (diarrhea).  How is this  treated?  This condition may be treated with:  Antibiotic medicine.  Other medicines.  Drinking enough water.  Follow these instructions at home:    Medicines  Take over-the-counter and prescription medicines only as told by your doctor.  If you were prescribed an antibiotic medicine, take it as told by your doctor. Do not stop taking it even if you start to feel better.  General instructions  Make sure you:  Pee until your bladder is empty.  Do not hold pee for a long time.  Empty your bladder after sex.  Wipe from front to back after pooping if you are a female. Use each tissue one time when you wipe.  Drink enough fluid to keep your pee pale yellow.  Keep all follow-up visits as told by your doctor. This is important.  Contact a doctor if:  You do not get better after 1-2 days.  Your symptoms go away and then come back.  Get help right away if:  You have very bad back pain.  You have very bad pain in your lower belly.  You have a fever.  You are sick to your stomach (nauseous).  You are throwing up.  Summary  A urinary tract infection (UTI) is an infection of any part of the urinary tract.  This condition is caused by germs in your genital area.  There are many risk factors for a UTI. These include having a small, thin tube to drain pee and not being able to control when you pee or poop.  Treatment includes antibiotic medicines for germs.  Drink enough fluid to keep your pee pale yellow.  This information is not intended to replace advice given to you by your health care provider. Make sure you discuss any questions you have with your health care provider.  Document Released: 06/05/2009 Document Revised: 12/05/2019 Document Reviewed: 06/27/2019  [employer] Patient Education © 2020 [employer]

## 2023-06-02 NOTE — PROGRESS NOTES
Subjective:     Rafaela Rosa is a 52 y.o. female who presents for Fever (X3 days, diarrhea, chills, abdominal pain, kidney pain and extremely fatigue )    Patient endorses having [place]. One hour later, she developed chills and shakiness. She was unable to eat due to her symptoms and endorses a low grade fever of 99.8. She has had intermittent low grade fevers for the past three days.     Fever   The maximum temperature noted was 99 to 99.9 F. Associated symptoms include abdominal pain, diarrhea and headaches. Pertinent negatives include no chest pain, coughing, ear pain, muscle aches, nausea, rash, sleepiness, sore throat, urinary pain, vomiting or wheezing. Associated symptoms comments: Urinary urgency. Treatments tried: Tylenol, tea, increased fluids, vitamins. The treatment provided mild relief.   Risk factors: contaminated water    Risk factors: no contaminated food, no hx of cancer, no immunosuppression, no occupational exposure, no recent sickness, no recent travel and no sick contacts        Review of Systems   Constitutional:  Positive for fever.   HENT:  Negative for ear pain, nosebleeds, sinus pain and sore throat.    Respiratory:  Negative for cough, shortness of breath and wheezing.    Cardiovascular:  Negative for chest pain.   Gastrointestinal:  Positive for abdominal pain and diarrhea. Negative for nausea and vomiting.   Genitourinary:  Positive for flank pain, frequency and urgency. Negative for dysuria and hematuria.   Skin:  Negative for itching and rash.   Neurological:  Positive for headaches.   All other systems reviewed and are negative.      PMH:   Past Medical History:   Diagnosis Date    Anemia     Anesthesia 1992    Please see explanation in Anesthesia History.  This was the only time there were complications with it.    Arthritis 2019    Slightly, in hands    Bronchitis     Mostly suceptible during flu season, immuno-compromised     Cancer (HCC) 1992    Thyroidectomy and 38 lympnodes removed with radiaton treatment    Coughing blood     Only associated with bad Bronchial infections    COVID-19 03/2020    COVID-19 11/2021    Delayed emergence from general anesthesia 1992    I was resuscitated after not awaking from my Throidectomy. The Surgeon's opion was that I had been given too much anesthesia.    Gynecological disorder 2021    Abnormal cells extracted from biopsy of uterus. Non-cancerous.    Indigestion     Primarily when exposed to food allergies (animal protein, gluten sensitivity)    Infectious disease 2020    Sars 2, complications - went septic    Pain 2018    Back, Neck, Wrists, Left Leg. 7/1/22-continues.    Primary insomnia     Rheumatic fever 1972    Thyroid disease      ALLERGIES:   Allergies   Allergen Reactions    Anesthesia S-I-40 [Propofol]      1992-Hard time being put under for thyroid surgery and ended up getting propofol after other meds. Had to be resuscitated to wake up.(stopped breathing, was blue, and heart had stopped). Doctor said was overdosed on anesthetic meds.     Boost High Protein [ ] Diarrhea, Vomiting and Nausea     SEVERE allergy to animal protein.  Fish is OK.    Gabapentin      Cognitive side effects    Other Food Diarrhea, Vomiting and Nausea     Animal protein makes violently ill.  Fish is okay    Gluten Meal Nausea     Intolerant. Causes nausea, lethargy, depression.     SURGHX:   Past Surgical History:   Procedure Laterality Date    BLOCK EPIDURAL STEROID INJECTION  03/24/2021    Procedure: INJECTION, STEROID, EPIDURAL;  Surgeon: Tato Kyle M.D.;  [facility];  Service: Pain Management    COLONOSCOPY  2019    WRIST EXPLORATION Right 2006    for window pane accident    THYROIDECTOMY  1992    iwth 30 lymph node dissection for Cancer    APPENDECTOMY  1977    ADENOIDECTOMY  1972     SOCHX:   Social History     Socioeconomic History    Marital status:    Tobacco Use     "Smoking status: Never    Smokeless tobacco: Never   Vaping Use    Vaping Use: Never used   Substance and Sexual Activity    Alcohol use: Yes     Alcohol/week: 3.0 oz     Types: 5 Glasses of wine per week     Comment: occ    Drug use: Never   Other Topics Concern     Service No    Blood Transfusions No    Caffeine Concern No    Occupational Exposure No    Hobby Hazards No    Sleep Concern Yes    Stress Concern Yes    Weight Concern No    Special Diet Yes    Back Care No    Exercise Yes    Bike Helmet No    Seat Belt Yes    Self-Exams Yes     FH:   Family History   Problem Relation Age of Onset    Heart Disease Mother     Cancer Mother     Cancer Father     Alcohol/Drug Brother     Breast Cancer Sister         Diagnosed 7/21         Objective:   /84   Pulse 98   Temp 37.5 °C (99.5 °F) (Temporal)   Resp 20   Ht 1.702 m (5' 7\")   Wt 62.6 kg (138 lb)   LMP 03/12/2020 (Within Weeks)   SpO2 95%   BMI 21.61 kg/m²     Physical Exam  Vitals reviewed.   Constitutional:       General: She is not in acute distress.     Appearance: Normal appearance. She is normal weight. She is ill-appearing. She is not toxic-appearing or diaphoretic.   HENT:      Head: Normocephalic and atraumatic.      Right Ear: External ear normal.      Left Ear: External ear normal.      Nose: Nose normal.      Mouth/Throat:      Mouth: Mucous membranes are moist.   Eyes:      Extraocular Movements: Extraocular movements intact.      Conjunctiva/sclera: Conjunctivae normal.      Pupils: Pupils are equal, round, and reactive to light.   Cardiovascular:      Rate and Rhythm: Normal rate and regular rhythm.      Pulses: Normal pulses.      Heart sounds: Normal heart sounds.   Pulmonary:      Effort: Pulmonary effort is normal.      Breath sounds: Normal breath sounds.   Abdominal:      General: Abdomen is flat. Bowel sounds are normal. There is no distension.      Palpations: Abdomen is soft. There is no mass.      Tenderness: There is " generalized abdominal tenderness. There is no right CVA tenderness, left CVA tenderness, guarding or rebound. Negative signs include Burgos's sign, Rovsing's sign, McBurney's sign, psoas sign and obturator sign.      Hernia: No hernia is present.   Musculoskeletal:         General: Normal range of motion.      Cervical back: Normal range of motion.   Skin:     General: Skin is warm and dry.   Neurological:      General: No focal deficit present.      Mental Status: She is alert and oriented to person, place, and time. Mental status is at baseline.   Psychiatric:         Mood and Affect: Mood normal.         Behavior: Behavior normal.         Thought Content: Thought content normal.     Lab Results   Component Value Date/Time    POCCOLOR dark yellow 06/02/2023 10:00 AM    POCAPPEAR cloudy 06/02/2023 10:00 AM    POCLEUKEST small 06/02/2023 10:00 AM    POCNITRITE pos 06/02/2023 10:00 AM    POCUROBILIGE 0.2 06/02/2023 10:00 AM    POCPROTEIN 100 06/02/2023 10:00 AM    POCURPH 5.5 06/02/2023 10:00 AM    POCBLOOD trace 06/02/2023 10:00 AM    POCSPGRV 1.015 06/02/2023 10:00 AM    POCKETONES trace 06/02/2023 10:00 AM    POCBILIRUBIN neg 06/02/2023 10:00 AM    POCGLUCUA neg 06/02/2023 10:00 AM       Urine culture in progress.    Assessment/Plan:   Assessment      1. Acute cystitis with hematuria  - POCT Urinalysis  - Urine Culture; Future  - ciprofloxacin (CIPRO) 500 MG Tab; Take 1 Tablet by mouth every 12 hours for 7 days.  Dispense: 14 Tablet; Refill: 0    2. Urinary frequency  - POCT Urinalysis  - Urine Culture; Future  - ciprofloxacin (CIPRO) 500 MG Tab; Take 1 Tablet by mouth every 12 hours for 7 days.  Dispense: 14 Tablet; Refill: 0    3. Urinary urgency  - POCT Urinalysis  - Urine Culture; Future  - ciprofloxacin (CIPRO) 500 MG Tab; Take 1 Tablet by mouth every 12 hours for 7 days.  Dispense: 14 Tablet; Refill: 0    4. Gastroenteritis     POCT urinalysis positive for leukocytes and nitrates.  Negative CVA tenderness.   Personal history of low back pain and fever.  Based on patient's symptoms, ciprofloxacin used to treat urinary tract infection.  Cautioned the patient about the side effects and BLACK BOX WARNINGS of flouroquinolones including: myasthenia gravis exacerbation, peripheral neuropathy, CNS effects, and tendonitis/tendon rupture.  Common reactions include GI upset, headaches, dizziness and altered mental status in the elderly. Take at least 1 hour before or 2 hours after antacids, or other drug products containing calcium/iron/zinc.  At this time, there is a low suspicion of pyelonephritis.  Patient educated on signs and symptoms of pyelonephritis and educated patient to seek additional treatment if she does not experience symptom improvement in 48 hours after starting antibiotic or if her symptoms worsen.     Gastroenteritis-discussed supportive care with patient including increasing hydration, electrolyte replacement, advancing diet as tolerated.   All questions answered. Patient verbalized understanding and is in agreement with this plan of care.     If symptoms are worsening or not improving in 3-5 days, follow-up with PCP or return to UC. Differential diagnosis, natural history, and supportive care discussed. AVS handout given and reviewed with patient. Patient educated on red flags and when to seek treatment back in ED or UC.     I personally reviewed prior external notes and test results pertinent to today's visit.  I have independently reviewed and interpreted all diagnostics ordered during this urgent care visit.     This dictation has been created using voice recognition software. The accuracy of the dictation is limited by the abilities of the software. I expect there may be some errors of grammar and possibly content. I made every attempt to manually correct the errors within my dictation. However, errors related to voice recognition software may still exist and should be interpreted within the appropriate  context.    This note was electronically signed by IGGY Abel

## 2023-06-04 LAB
BACTERIA UR CULT: ABNORMAL
BACTERIA UR CULT: ABNORMAL
SIGNIFICANT IND 70042: ABNORMAL
SITE SITE: ABNORMAL
SOURCE SOURCE: ABNORMAL

## 2023-06-20 ENCOUNTER — APPOINTMENT (OUTPATIENT)
Dept: RADIOLOGY | Facility: MEDICAL CENTER | Age: 53
End: 2023-06-20
Attending: FAMILY MEDICINE
Payer: COMMERCIAL

## 2023-06-20 DIAGNOSIS — Z12.31 VISIT FOR SCREENING MAMMOGRAM: ICD-10-CM

## 2023-06-29 ENCOUNTER — OFFICE VISIT (OUTPATIENT)
Dept: MEDICAL GROUP | Facility: MEDICAL CENTER | Age: 53
End: 2023-06-29
Payer: COMMERCIAL

## 2023-06-29 VITALS
HEIGHT: 67 IN | TEMPERATURE: 96 F | SYSTOLIC BLOOD PRESSURE: 118 MMHG | DIASTOLIC BLOOD PRESSURE: 60 MMHG | OXYGEN SATURATION: 97 % | BODY MASS INDEX: 22.6 KG/M2 | WEIGHT: 144 LBS | HEART RATE: 87 BPM

## 2023-06-29 DIAGNOSIS — Z13.6 SCREENING FOR CARDIOVASCULAR CONDITION: ICD-10-CM

## 2023-06-29 DIAGNOSIS — Z85.850 HISTORY OF THYROID CANCER: ICD-10-CM

## 2023-06-29 DIAGNOSIS — Z11.59 NEED FOR HEPATITIS C SCREENING TEST: ICD-10-CM

## 2023-06-29 DIAGNOSIS — E03.9 ACQUIRED HYPOTHYROIDISM: ICD-10-CM

## 2023-06-29 DIAGNOSIS — Z12.31 ENCOUNTER FOR SCREENING MAMMOGRAM FOR MALIGNANT NEOPLASM OF BREAST: ICD-10-CM

## 2023-06-29 PROCEDURE — 3078F DIAST BP <80 MM HG: CPT | Performed by: FAMILY MEDICINE

## 2023-06-29 PROCEDURE — 3074F SYST BP LT 130 MM HG: CPT | Performed by: FAMILY MEDICINE

## 2023-06-29 PROCEDURE — 99213 OFFICE O/P EST LOW 20 MIN: CPT | Performed by: FAMILY MEDICINE

## 2023-06-29 RX ORDER — LIOTHYRONINE SODIUM 5 UG/1
5 TABLET ORAL DAILY
Qty: 90 TABLET | Refills: 1 | Status: SHIPPED | OUTPATIENT
Start: 2023-06-29 | End: 2023-12-07 | Stop reason: SDUPTHER

## 2023-06-29 RX ORDER — LEVOTHYROXINE SODIUM 0.07 MG/1
75 TABLET ORAL
Qty: 90 TABLET | Refills: 1 | Status: SHIPPED | OUTPATIENT
Start: 2023-06-29 | End: 2023-12-07 | Stop reason: SDUPTHER

## 2023-06-29 ASSESSMENT — PATIENT HEALTH QUESTIONNAIRE - PHQ9: CLINICAL INTERPRETATION OF PHQ2 SCORE: 0

## 2023-06-29 ASSESSMENT — FIBROSIS 4 INDEX: FIB4 SCORE: 0.79

## 2023-06-29 NOTE — PROGRESS NOTES
"  Subjective:     Rafaela Rosa is a 52 y.o. female presenting for a follow up          Current Outpatient Medications:     levothyroxine (SYNTHROID) 75 MCG Tab, Take 1 Tablet by mouth every morning on an empty stomach., Disp: 90 Tablet, Rfl: 1    liothyronine (CYTOMEL) 5 MCG Tab, Take 1 Tablet by mouth every day., Disp: 90 Tablet, Rfl: 1    Ferrous Sulfate (IRON PO), Take 22 mg by mouth every 48 hours., Disp: , Rfl:     Non Formulary Request, Take 4 Capsules by mouth every day. HYDROEYE, Disp: , Rfl:     B Complex Vitamins (VITAMIN B COMPLEX PO), Take 1 Tablet by mouth every day., Disp: , Rfl:     vitamin D3 (VITAMIN D3) 5000 Unit (125 mcg) Tab, Take 1,000 Units by mouth every day., Disp: , Rfl:     Probiotic Product (PROBIOTIC PO), Take 1 Capsule by mouth every day., Disp: , Rfl:     Zinc 50 MG Cap, Take 50 mg by mouth every day., Disp: , Rfl:     QUERCETIN PO, Take 4,000 mg by mouth every day., Disp: , Rfl:     Non Formulary Request, Take 3 Capsules by mouth every day. WELLNESS FORMULA, Disp: , Rfl:     Biotin 5000 MCG Tab, Take 1 Tablet by mouth every day., Disp: , Rfl:     Ascorbic Acid (VITAMIN C) 1000 MG Tab, Take 100 mg by mouth every day., Disp: , Rfl:     Cyanocobalamin (VITAMIN B-12) 1000 MCG Tab, Take 1,000 mcg by mouth every day., Disp: , Rfl:     Objective:     Vitals: /60 (BP Location: Right arm, Patient Position: Sitting, BP Cuff Size: Adult)   Pulse 87   Temp (!) 35.6 °C (96 °F) (Temporal)   Ht 1.702 m (5' 7\")   Wt 65.3 kg (144 lb)   LMP 03/12/2020 (Within Weeks)   SpO2 97%   BMI 22.55 kg/m²   General: Alert  HEENT: Normocephalic.  Heart: Regular rate and rhythm.  S1 and S2 normal.  No murmurs appreciated.  Respiratory: Normal respiratory effort.  Clear to auscultation bilaterally.  Abdomen: Non-distended, soft  Extremities: No leg edema.    Assessment/Plan:     Diagnoses and all orders for this visit:    Acquired hypothyroidism  History of thyroid cancer  Chronic, stable.  " She needs a new referral to endocrinology, her previous provider left.  In reviewing her last note, her TSH does not need to be as suppressed, but no target level was noted.  We discussed continuing her current medications for now until she can see a new endocrinologist.  Will also order the following labs  -     CBC WITHOUT DIFFERENTIAL; Future  -     Comp Metabolic Panel; Future  -     TSH; Future  -     FREE THYROXINE; Future  -     TRIIDOTHYRONINE; Future  -     VITAMIN D,25 HYDROXY (DEFICIENCY); Future  -     THYROGLOBULIN, QT; Future  -     Referral to Endocrinology  -     levothyroxine (SYNTHROID) 75 MCG Tab; Take 1 Tablet by mouth every morning on an empty stomach.  -     liothyronine (CYTOMEL) 5 MCG Tab; Take 1 Tablet by mouth every day.    Screening for cardiovascular condition  -     Lipid Profile; Future    Need for hepatitis C screening test  -     HEP C VIRUS ANTIBODY; Future    Encounter for screening mammogram for malignant neoplasm of breast  -     MA-SCREENING MAMMO BILAT W/TOMOSYNTHESIS W/CAD; Future          Return in about 1 year (around 6/29/2024) for a routine follow up.

## 2023-08-21 ENCOUNTER — HOSPITAL ENCOUNTER (OUTPATIENT)
Facility: MEDICAL CENTER | Age: 53
End: 2023-08-21
Attending: NURSE PRACTITIONER
Payer: COMMERCIAL

## 2023-08-21 PROCEDURE — 87624 HPV HI-RISK TYP POOLED RSLT: CPT

## 2023-08-21 PROCEDURE — 88175 CYTOPATH C/V AUTO FLUID REDO: CPT

## 2023-08-22 ENCOUNTER — HOSPITAL ENCOUNTER (OUTPATIENT)
Dept: LAB | Facility: MEDICAL CENTER | Age: 53
End: 2023-08-22
Attending: FAMILY MEDICINE
Payer: COMMERCIAL

## 2023-08-22 DIAGNOSIS — Z85.850 HISTORY OF THYROID CANCER: ICD-10-CM

## 2023-08-22 DIAGNOSIS — Z11.59 NEED FOR HEPATITIS C SCREENING TEST: ICD-10-CM

## 2023-08-22 DIAGNOSIS — Z13.6 SCREENING FOR CARDIOVASCULAR CONDITION: ICD-10-CM

## 2023-08-22 DIAGNOSIS — E03.9 ACQUIRED HYPOTHYROIDISM: ICD-10-CM

## 2023-08-22 LAB
25(OH)D3 SERPL-MCNC: 80 NG/ML (ref 30–100)
ALBUMIN SERPL BCP-MCNC: 4.6 G/DL (ref 3.2–4.9)
ALBUMIN/GLOB SERPL: 1.5 G/DL
ALP SERPL-CCNC: 42 U/L (ref 30–99)
ALT SERPL-CCNC: 17 U/L (ref 2–50)
ANION GAP SERPL CALC-SCNC: 11 MMOL/L (ref 7–16)
AST SERPL-CCNC: 18 U/L (ref 12–45)
BILIRUB SERPL-MCNC: 0.7 MG/DL (ref 0.1–1.5)
BUN SERPL-MCNC: 14 MG/DL (ref 8–22)
CALCIUM ALBUM COR SERPL-MCNC: 9.4 MG/DL (ref 8.5–10.5)
CALCIUM SERPL-MCNC: 9.9 MG/DL (ref 8.4–10.2)
CHLORIDE SERPL-SCNC: 104 MMOL/L (ref 96–112)
CHOLEST SERPL-MCNC: 166 MG/DL (ref 100–199)
CO2 SERPL-SCNC: 26 MMOL/L (ref 20–33)
CREAT SERPL-MCNC: 0.75 MG/DL (ref 0.5–1.4)
ERYTHROCYTE [DISTWIDTH] IN BLOOD BY AUTOMATED COUNT: 45 FL (ref 35.9–50)
FASTING STATUS PATIENT QL REPORTED: NORMAL
GFR SERPLBLD CREATININE-BSD FMLA CKD-EPI: 94 ML/MIN/1.73 M 2
GLOBULIN SER CALC-MCNC: 3 G/DL (ref 1.9–3.5)
GLUCOSE SERPL-MCNC: 94 MG/DL (ref 65–99)
HCT VFR BLD AUTO: 44.8 % (ref 37–47)
HCV AB SER QL: NORMAL
HDLC SERPL-MCNC: 98 MG/DL
HGB BLD-MCNC: 15.1 G/DL (ref 12–16)
LDLC SERPL CALC-MCNC: 63 MG/DL
MCH RBC QN AUTO: 31.2 PG (ref 27–33)
MCHC RBC AUTO-ENTMCNC: 33.7 G/DL (ref 32.2–35.5)
MCV RBC AUTO: 92.6 FL (ref 81.4–97.8)
PLATELET # BLD AUTO: 214 K/UL (ref 164–446)
PMV BLD AUTO: 8.8 FL (ref 9–12.9)
POTASSIUM SERPL-SCNC: 4.5 MMOL/L (ref 3.6–5.5)
PROT SERPL-MCNC: 7.6 G/DL (ref 6–8.2)
RBC # BLD AUTO: 4.84 M/UL (ref 4.2–5.4)
SODIUM SERPL-SCNC: 141 MMOL/L (ref 135–145)
T3 SERPL-MCNC: 166 NG/DL (ref 60–181)
T4 FREE SERPL-MCNC: 1.39 NG/DL (ref 0.93–1.7)
TRIGL SERPL-MCNC: 23 MG/DL (ref 0–149)
TSH SERPL DL<=0.005 MIU/L-ACNC: 0.04 UIU/ML (ref 0.38–5.33)
WBC # BLD AUTO: 4.2 K/UL (ref 4.8–10.8)

## 2023-08-22 PROCEDURE — 85027 COMPLETE CBC AUTOMATED: CPT

## 2023-08-22 PROCEDURE — 84443 ASSAY THYROID STIM HORMONE: CPT

## 2023-08-22 PROCEDURE — 86800 THYROGLOBULIN ANTIBODY: CPT

## 2023-08-22 PROCEDURE — 86803 HEPATITIS C AB TEST: CPT

## 2023-08-22 PROCEDURE — 82306 VITAMIN D 25 HYDROXY: CPT

## 2023-08-22 PROCEDURE — 80061 LIPID PANEL: CPT

## 2023-08-22 PROCEDURE — 80053 COMPREHEN METABOLIC PANEL: CPT

## 2023-08-22 PROCEDURE — 84439 ASSAY OF FREE THYROXINE: CPT

## 2023-08-22 PROCEDURE — 84480 ASSAY TRIIODOTHYRONINE (T3): CPT

## 2023-08-22 PROCEDURE — 84432 ASSAY OF THYROGLOBULIN: CPT

## 2023-08-22 PROCEDURE — 36415 COLL VENOUS BLD VENIPUNCTURE: CPT

## 2023-08-24 ENCOUNTER — PATIENT MESSAGE (OUTPATIENT)
Dept: HEALTH INFORMATION MANAGEMENT | Facility: OTHER | Age: 53
End: 2023-08-24

## 2023-08-24 LAB
CYTOLOGIST CVX/VAG CYTO: NORMAL
CYTOLOGY CVX/VAG DOC CYTO: NORMAL
CYTOLOGY CVX/VAG DOC THIN PREP: NORMAL
HPV I/H RISK 4 DNA CVX QL PROBE+SIG AMP: NEGATIVE
HPV REFLEX NL1174300: NORMAL
NOTE NL11727A: NORMAL
OTHER STN SPEC: NORMAL
STAT OF ADQ CVX/VAG CYTO-IMP: NORMAL

## 2023-10-05 ENCOUNTER — DOCUMENTATION (OUTPATIENT)
Dept: HEALTH INFORMATION MANAGEMENT | Facility: OTHER | Age: 53
End: 2023-10-05
Payer: COMMERCIAL

## 2023-12-07 DIAGNOSIS — Z85.850 HISTORY OF THYROID CANCER: ICD-10-CM

## 2023-12-07 DIAGNOSIS — E03.9 ACQUIRED HYPOTHYROIDISM: ICD-10-CM

## 2023-12-07 RX ORDER — LIOTHYRONINE SODIUM 5 UG/1
5 TABLET ORAL DAILY
Qty: 90 TABLET | Refills: 0 | Status: SHIPPED | OUTPATIENT
Start: 2023-12-07 | End: 2024-01-23 | Stop reason: SDUPTHER

## 2023-12-07 RX ORDER — LEVOTHYROXINE SODIUM 0.07 MG/1
75 TABLET ORAL
Qty: 90 TABLET | Refills: 0 | Status: SHIPPED | OUTPATIENT
Start: 2023-12-07 | End: 2023-12-19 | Stop reason: SDUPTHER

## 2023-12-07 NOTE — TELEPHONE ENCOUNTER
----- Message from Geneva Alcantara sent at 12/7/2023 10:32 AM PST -----  Regarding: refill   [facility] patient. She is re-establishing but needs a refill of they tyroid medications.   Levothyroxine, Liothyronine sent to [facility] in [place].

## 2023-12-07 NOTE — TELEPHONE ENCOUNTER
Patient has history of thyroid cancer and needs to continue thyroid supplementation.  Patient does need to reestablish with a new provider as soon as possible to continue current medications.  A 90-day prescription for her thyroid medications has been sent to her [facility] pharmacy

## 2023-12-19 DIAGNOSIS — E03.9 ACQUIRED HYPOTHYROIDISM: ICD-10-CM

## 2023-12-19 DIAGNOSIS — Z85.850 HISTORY OF THYROID CANCER: ICD-10-CM

## 2023-12-19 RX ORDER — LEVOTHYROXINE SODIUM 0.07 MG/1
75 TABLET ORAL
Qty: 90 TABLET | Refills: 0 | Status: SHIPPED | OUTPATIENT
Start: 2023-12-19 | End: 2024-01-23 | Stop reason: SDUPTHER

## 2023-12-19 NOTE — TELEPHONE ENCOUNTER
Patient can't tolerated Levothyroxine and needs  NEW script for Synthroid 75 mcg.     Received request via: Patient    Was the patient seen in the last year in this department? Yes    Does the patient have an active prescription (recently filled or refills available) for medication(s) requested? No    Does the patient have care home Plus and need 100 day supply (blood pressure, diabetes and cholesterol meds only)? Patient does not have SCP

## 2024-01-23 ENCOUNTER — OFFICE VISIT (OUTPATIENT)
Dept: MEDICAL GROUP | Facility: LAB | Age: 54
End: 2024-01-23
Payer: COMMERCIAL

## 2024-01-23 VITALS
TEMPERATURE: 97.5 F | SYSTOLIC BLOOD PRESSURE: 122 MMHG | RESPIRATION RATE: 16 BRPM | HEART RATE: 99 BPM | OXYGEN SATURATION: 97 % | WEIGHT: 136.69 LBS | HEIGHT: 67 IN | DIASTOLIC BLOOD PRESSURE: 66 MMHG | BODY MASS INDEX: 21.45 KG/M2

## 2024-01-23 DIAGNOSIS — Z12.11 COLON CANCER SCREENING: ICD-10-CM

## 2024-01-23 DIAGNOSIS — E03.9 ACQUIRED HYPOTHYROIDISM: ICD-10-CM

## 2024-01-23 DIAGNOSIS — Z85.850 HISTORY OF THYROID CANCER: ICD-10-CM

## 2024-01-23 DIAGNOSIS — Z76.89 ENCOUNTER TO ESTABLISH CARE: ICD-10-CM

## 2024-01-23 DIAGNOSIS — Z12.31 ENCOUNTER FOR SCREENING MAMMOGRAM FOR MALIGNANT NEOPLASM OF BREAST: ICD-10-CM

## 2024-01-23 DIAGNOSIS — M79.672 LEFT FOOT PAIN: ICD-10-CM

## 2024-01-23 PROBLEM — Z86.39 H/O HASHIMOTO THYROIDITIS: Status: ACTIVE | Noted: 2024-01-23

## 2024-01-23 PROBLEM — H04.129 DRY EYE: Status: ACTIVE | Noted: 2024-01-23

## 2024-01-23 PROCEDURE — 99214 OFFICE O/P EST MOD 30 MIN: CPT | Performed by: PHYSICIAN ASSISTANT

## 2024-01-23 PROCEDURE — 3074F SYST BP LT 130 MM HG: CPT | Performed by: PHYSICIAN ASSISTANT

## 2024-01-23 PROCEDURE — 3078F DIAST BP <80 MM HG: CPT | Performed by: PHYSICIAN ASSISTANT

## 2024-01-23 RX ORDER — METHYLPREDNISOLONE 4 MG/1
TABLET ORAL
Qty: 21 TABLET | Refills: 0 | Status: SHIPPED | OUTPATIENT
Start: 2024-01-23

## 2024-01-23 RX ORDER — LEVOTHYROXINE SODIUM 0.07 MG/1
75 TABLET ORAL
Qty: 90 TABLET | Refills: 3 | Status: SHIPPED | OUTPATIENT
Start: 2024-01-23 | End: 2024-02-02 | Stop reason: SDUPTHER

## 2024-01-23 RX ORDER — LIOTHYRONINE SODIUM 5 UG/1
5 TABLET ORAL DAILY
Qty: 90 TABLET | Refills: 3 | Status: SHIPPED | OUTPATIENT
Start: 2024-01-23

## 2024-01-23 RX ORDER — LIOTHYRONINE SODIUM 5 UG/1
5 TABLET ORAL DAILY
Qty: 90 TABLET | Refills: 3 | Status: SHIPPED | OUTPATIENT
Start: 2024-01-23 | End: 2024-01-23 | Stop reason: SDUPTHER

## 2024-01-23 RX ORDER — LEVOTHYROXINE SODIUM 0.07 MG/1
75 TABLET ORAL
Qty: 90 TABLET | Refills: 3 | Status: SHIPPED | OUTPATIENT
Start: 2024-01-23 | End: 2024-01-23 | Stop reason: SDUPTHER

## 2024-01-23 ASSESSMENT — FIBROSIS 4 INDEX: FIB4 SCORE: 1.12

## 2024-01-23 ASSESSMENT — PATIENT HEALTH QUESTIONNAIRE - PHQ9: CLINICAL INTERPRETATION OF PHQ2 SCORE: 0

## 2024-01-23 NOTE — PROGRESS NOTES
"Subjective:     CC:  There were no encounter diagnoses.    HISTORY OF THE PRESENT ILLNESS: Patient is a 53 y.o. female. This pleasant patient is here today to establish care and discuss thyroid. His/her prior PCP was Dr Magallanes.    Thyroid Concerns  -hx of thyroid cancer, s/p thyroidectomy  -pt needs new referral to endocrinology to re-establish care  -needs refills on thyroid medications today    L foot Pain  -prev eval with podiatry  -pain on the ball of the foot, worse with standing for prolonged periods  -prev discussed exploratory surgery which pt would like to defer on at this time  -? Charlie's neuroma    Health Maintenance     - Dyslipidemia (30-45): UTD  - Diabetes (HTN, HLD, BMI >25): UTD  - Depression screening (PHQ-2 and/or PHQ-9): neg  - Dental: UTD  - Eye: UTD  Diet: avoids gluten, animal proteins except for fish  Exercise: regular  Substance Use: denies  Tobacco Use/counseling: denies  Safe in relationship: yes  Seat belts, bike helmet, gun safety discussed.  Sun protection used.       Cancer screening  - Colon CA (45-75) - FIT (annual) cspy (q10yr): ordered  - Cervical CA (21-65):   -  HX Abnormal pap/HPV: none  - Breast CA: mammo (required 50-75yo) or starting 40 (ACOG, ACR), 45 (ACS), 50 (USPTF): ordered  - Skin cancer screening: follows with derm     Infectious disease screening/Immunizations  --Practices safe sex.  --Hepatitis C Screening (18 to 78 yo): prev completed  --Immunizations: \"I'm allergic to them\"      Current Outpatient Medications Ordered in Epic   Medication Sig Dispense Refill    levothyroxine (SYNTHROID) 75 MCG Tab Take 1 Tablet by mouth every morning on an empty stomach. 90 Tablet 0    liothyronine (CYTOMEL) 5 MCG Tab Take 1 Tablet by mouth every day. 90 Tablet 0    Ferrous Sulfate (IRON PO) Take 22 mg by mouth every 48 hours.      Non Formulary Request Take 4 Capsules by mouth every day. HYDROEYE      B Complex Vitamins (VITAMIN B COMPLEX PO) Take 1 Tablet by mouth every day.   " "   vitamin D3 (VITAMIN D3) 5000 Unit (125 mcg) Tab Take 1,000 Units by mouth every day.      Probiotic Product (PROBIOTIC PO) Take 1 Capsule by mouth every day.      Zinc 50 MG Cap Take 50 mg by mouth every day.      QUERCETIN PO Take 4,000 mg by mouth every day.      Non Formulary Request Take 3 Capsules by mouth every day. WELLNESS FORMULA      Biotin 5000 MCG Tab Take 1 Tablet by mouth every day.      Ascorbic Acid (VITAMIN C) 1000 MG Tab Take 100 mg by mouth every day.      Cyanocobalamin (VITAMIN B-12) 1000 MCG Tab Take 1,000 mcg by mouth every day.       No current [facility]-ordered facility-administered medications on file.         ROS:   Gen: no fevers/chills, no changes in weight  Eyes: no changes in vision  ENT: no sore throat, no hearing loss, no bloody nose  Pulm: no sob, no cough  CV: no chest pain, no palpitations  GI: no nausea/vomiting, no diarrhea  : no dysuria  MSk: no myalgias  Skin: no rash  Neuro: no headaches, no numbness/tingling  Heme/Lymph: no easy bruising      Objective:     Exam: /66 (BP Location: Right arm, Patient Position: Sitting, BP Cuff Size: Adult)   Pulse 99   Temp 36.4 °C (97.5 °F) (Temporal)   Resp 16   Ht 1.702 m (5' 7\")   Wt 62 kg (136 lb 11 oz)   SpO2 97%  Body mass index is 21.41 kg/m².    General: Normal appearing. No distress.  HEENT: Normocephalic. Eyes conjunctiva clear lids without ptosis, pupils equal and reactive to light accommodation, ears normal shape and contour, canals are clear bilaterally, tympanic membranes are benign, nasal mucosa benign, oropharynx is without erythema, edema or exudates.   Neck: Supple without JVD or bruit. Thyroid is not enlarged.  Pulmonary: Clear to ausculation.  Normal effort. No rales, ronchi, or wheezing.  Cardiovascular: Regular rate and rhythm without murmur. Carotid and radial pulses are intact and equal bilaterally.  Abdomen: Soft, nontender, nondistended. Normal bowel sounds. Liver and spleen are not palpable  Neurologic: " Grossly nonfocal  Lymph: No cervical or supraclavicular lymph nodes are palpable  Skin: Warm and dry.  No obvious lesions.  Musculoskeletal: Normal gait. No extremity cyanosis, clubbing, or edema.  Psych: Normal mood and affect. Alert and oriented x3. Judgment and insight is normal.  MSK: Patient is able to bear weight on both feet  Ankles: LEFT: No noticeable deformity, swelling or bruising. ROM intact. Tenderness to palpation on plantar aspect of the foot b/w 3rd and 4th digit. No tenderness to palpation along posterior edge of lateral and medial malleoli, base of 5th metatarsal or navicular bone. No tenderness along fibula. Squeeze test negative. External rotation test negative. Anterior drawer negative. Talar tilt negative. 5/5 strength bilaterally. Sensation intact. 2+ pedal pulses. Ankle reflex 2+.       Assessment & Plan:   53 y.o. female with the following -    1. Encounter to establish care  Labs per orders  Vaccinations per orders  Screenings per orders      2. History of thyroid cancer  3. Acquired hypothyroidism  Chronic condition, stable  Updated thyroid lab orders entered.  Patient also has been referred to endocrinology to reestablish care.  Also sent in refills for her thyroid medications until she is able to establish with endocrinology  - Referral to Endocrinology  - TSH; Future  - FREE THYROXINE; Future  - TRIIDOTHYRONINE; Future  - THYROGLOBULIN, QT; Future  - levothyroxine (SYNTHROID) 75 MCG Tab; Take 1 Tablet by mouth every morning on an empty stomach.  Dispense: 90 Tablet; Refill: 3  - liothyronine (CYTOMEL) 5 MCG Tab; Take 1 Tablet by mouth every day.  Dispense: 90 Tablet; Refill: 3    4. 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  Patient is a previous eval with podiatry/Ortho.  They have discussed possible exploratory surgery for treatment of this although patient would like to defer at this time.  We will proceed with trial of short-term steroid burst to see if  he can reduce some of her foot pain  - methylPREDNISolone (MEDROL DOSEPAK) 4 MG Tablet Therapy Pack; As directed on the packaging label.  Dispense: 21 Tablet; Refill: 0    5. Colon cancer screening  - Referral to GI for Colonoscopy    6. Encounter for screening mammogram for malignant neoplasm of breast  - MA-SCREENING MAMMO BILAT W/TOMOSYNTHESIS W/CAD; Future      I spent a total of 28 minutes with record review, exam, communication with the patient, communication with other providers, and documentation of this encounter.    No follow-ups on file.    Please note that this dictation was created using voice recognition software. I have made every reasonable attempt to correct obvious errors, but I expect that there are errors of grammar and possibly content that I did not discover before finalizing the note.

## 2024-01-26 ENCOUNTER — TELEPHONE (OUTPATIENT)
Dept: MEDICAL GROUP | Facility: LAB | Age: 54
End: 2024-01-26
Payer: COMMERCIAL

## 2024-01-26 NOTE — TELEPHONE ENCOUNTER
Phone Number Called: [phone] (home)      Call outcome: Left detailed message for patient. Informed to call back with any additional questions.    Message: LVM letting patient know [facility] call saying her medication is on backorder to call the office back to let us know where to sent the Rx [phone]

## 2024-02-01 ENCOUNTER — TELEPHONE (OUTPATIENT)
Dept: MEDICAL GROUP | Facility: LAB | Age: 54
End: 2024-02-01
Payer: COMMERCIAL

## 2024-02-01 DIAGNOSIS — E03.9 ACQUIRED HYPOTHYROIDISM: ICD-10-CM

## 2024-02-01 DIAGNOSIS — Z85.850 HISTORY OF THYROID CANCER: ICD-10-CM

## 2024-02-01 NOTE — TELEPHONE ENCOUNTER
1. Caller Name: Rafaela                         Call Back Number: [phone] (home)        How would the patient prefer to be contacted with a response: Phone call OK to leave a detailed message    Pt is calling back about a med and is asking for it to be sent to [facility] on [address].  There were 3 Rx's sent at her appt.  She is not sure which one.

## 2024-02-02 ENCOUNTER — TELEPHONE (OUTPATIENT)
Dept: MEDICAL GROUP | Facility: LAB | Age: 54
End: 2024-02-02
Payer: COMMERCIAL

## 2024-02-02 DIAGNOSIS — E03.9 ACQUIRED HYPOTHYROIDISM: ICD-10-CM

## 2024-02-02 DIAGNOSIS — Z85.850 HISTORY OF THYROID CANCER: ICD-10-CM

## 2024-02-02 RX ORDER — LEVOTHYROXINE SODIUM 0.07 MG/1
TABLET ORAL
Qty: 90 TABLET | Refills: 3 | OUTPATIENT
Start: 2024-02-02

## 2024-02-02 RX ORDER — LEVOTHYROXINE SODIUM 0.07 MG/1
75 TABLET ORAL
Qty: 90 TABLET | Refills: 3 | Status: SHIPPED | OUTPATIENT
Start: 2024-02-02 | End: 2024-02-02 | Stop reason: SDUPTHER

## 2024-02-02 RX ORDER — LEVOTHYROXINE SODIUM 0.07 MG/1
75 TABLET ORAL
Qty: 90 TABLET | Refills: 3 | Status: SHIPPED
Start: 2024-02-02 | End: 2024-02-02

## 2024-02-02 RX ORDER — LEVOTHYROXINE SODIUM 75 MCG
75 TABLET ORAL
Qty: 90 TABLET | Refills: 3 | Status: SHIPPED | OUTPATIENT
Start: 2024-02-02

## 2024-02-02 NOTE — TELEPHONE ENCOUNTER
Received request via: Patient. For the Synthroid    Was the patient seen in the last year in this department? Yes    Does the patient have an active prescription (recently filled or refills available) for medication(s) requested? No    Pharmacy Name: [facility]    Does the patient have custodial Plus and need 100 day supply (blood pressure, diabetes and cholesterol meds only)? Patient does not have SCP

## 2024-02-02 NOTE — TELEPHONE ENCOUNTER
Received request via: Patient. Brand specific    Was the patient seen in the last year in this department? Yes    Does the patient have an active prescription (recently filled or refills available) for medication(s) requested? No    [facility]    Does the patient have long term Plus and need 100 day supply (blood pressure, diabetes and cholesterol meds only)? Patient does not have SCP

## 2024-02-02 NOTE — TELEPHONE ENCOUNTER
Phone Number Called: [phone] (home)      Call outcome: Spoke to patient regarding message below.    Message: Let patient know that [facility] and [facility]'t have the specific brand of thyroid medication she is requiring that the only options are to go with generic or for her to call around she said she can't do generic so she gave me another brand to try at [facility] if [facility] doesn't carry she said to send Synthriod to [facility], but if they have her brand at [facility] to move over all prescriptions to [facility] on [address] for 90 days with refills

## 2024-03-05 ENCOUNTER — HOSPITAL ENCOUNTER (OUTPATIENT)
Dept: LAB | Facility: MEDICAL CENTER | Age: 54
End: 2024-03-05
Attending: PHYSICIAN ASSISTANT
Payer: COMMERCIAL

## 2024-03-05 DIAGNOSIS — Z85.850 HISTORY OF THYROID CANCER: ICD-10-CM

## 2024-03-05 DIAGNOSIS — E03.9 ACQUIRED HYPOTHYROIDISM: ICD-10-CM

## 2024-03-05 LAB
T3 SERPL-MCNC: 105 NG/DL (ref 60–181)
T4 FREE SERPL-MCNC: 1.04 NG/DL (ref 0.93–1.7)
TSH SERPL DL<=0.005 MIU/L-ACNC: 0.09 UIU/ML (ref 0.38–5.33)

## 2024-03-05 PROCEDURE — 84443 ASSAY THYROID STIM HORMONE: CPT

## 2024-03-05 PROCEDURE — 84432 ASSAY OF THYROGLOBULIN: CPT

## 2024-03-05 PROCEDURE — 84480 ASSAY TRIIODOTHYRONINE (T3): CPT

## 2024-03-05 PROCEDURE — 36415 COLL VENOUS BLD VENIPUNCTURE: CPT

## 2024-03-05 PROCEDURE — 84439 ASSAY OF FREE THYROXINE: CPT

## 2024-03-05 PROCEDURE — 86800 THYROGLOBULIN ANTIBODY: CPT

## 2024-03-14 ENCOUNTER — PATIENT MESSAGE (OUTPATIENT)
Dept: MEDICAL GROUP | Facility: LAB | Age: 54
End: 2024-03-14
Payer: COMMERCIAL

## 2024-03-14 DIAGNOSIS — Z12.83 SCREENING FOR SKIN CANCER: ICD-10-CM

## 2024-07-12 ENCOUNTER — PATIENT MESSAGE (OUTPATIENT)
Dept: MEDICAL GROUP | Facility: LAB | Age: 54
End: 2024-07-12
Payer: COMMERCIAL

## 2024-07-12 DIAGNOSIS — H04.129 DRY EYE: ICD-10-CM

## 2024-07-12 DIAGNOSIS — H33.319 RETINAL TEAR, UNSPECIFIED LATERALITY: ICD-10-CM

## 2024-08-12 ENCOUNTER — APPOINTMENT (OUTPATIENT)
Dept: RADIOLOGY | Facility: MEDICAL CENTER | Age: 54
End: 2024-08-12
Attending: PHYSICIAN ASSISTANT
Payer: COMMERCIAL

## 2024-08-19 ENCOUNTER — APPOINTMENT (OUTPATIENT)
Dept: RADIOLOGY | Facility: MEDICAL CENTER | Age: 54
End: 2024-08-19
Attending: PHYSICIAN ASSISTANT
Payer: COMMERCIAL

## 2024-08-28 ENCOUNTER — OFFICE VISIT (OUTPATIENT)
Dept: MEDICAL GROUP | Facility: LAB | Age: 54
End: 2024-08-28
Payer: COMMERCIAL

## 2024-08-28 ENCOUNTER — HOSPITAL ENCOUNTER (OUTPATIENT)
Dept: LAB | Facility: MEDICAL CENTER | Age: 54
End: 2024-08-28
Attending: PHYSICIAN ASSISTANT
Payer: COMMERCIAL

## 2024-08-28 VITALS
TEMPERATURE: 97.8 F | SYSTOLIC BLOOD PRESSURE: 122 MMHG | HEART RATE: 73 BPM | RESPIRATION RATE: 18 BRPM | HEIGHT: 67 IN | OXYGEN SATURATION: 98 % | BODY MASS INDEX: 22.49 KG/M2 | WEIGHT: 143.3 LBS | DIASTOLIC BLOOD PRESSURE: 60 MMHG

## 2024-08-28 DIAGNOSIS — R10.13 EPIGASTRIC PAIN: ICD-10-CM

## 2024-08-28 DIAGNOSIS — Z12.11 COLON CANCER SCREENING: ICD-10-CM

## 2024-08-28 DIAGNOSIS — M79.672 LEFT FOOT PAIN: ICD-10-CM

## 2024-08-28 LAB
ALBUMIN SERPL BCP-MCNC: 4.3 G/DL (ref 3.2–4.9)
ALBUMIN/GLOB SERPL: 1.5 G/DL
ALP SERPL-CCNC: 37 U/L (ref 30–99)
ALT SERPL-CCNC: 14 U/L (ref 2–50)
AMYLASE SERPL-CCNC: 59 U/L (ref 20–103)
ANION GAP SERPL CALC-SCNC: 9 MMOL/L (ref 7–16)
AST SERPL-CCNC: 18 U/L (ref 12–45)
BASOPHILS # BLD AUTO: 0.9 % (ref 0–1.8)
BASOPHILS # BLD: 0.05 K/UL (ref 0–0.12)
BILIRUB SERPL-MCNC: 0.6 MG/DL (ref 0.1–1.5)
BUN SERPL-MCNC: 15 MG/DL (ref 8–22)
CALCIUM ALBUM COR SERPL-MCNC: 10 MG/DL (ref 8.5–10.5)
CALCIUM SERPL-MCNC: 10.2 MG/DL (ref 8.5–10.5)
CHLORIDE SERPL-SCNC: 101 MMOL/L (ref 96–112)
CO2 SERPL-SCNC: 27 MMOL/L (ref 20–33)
CREAT SERPL-MCNC: 0.81 MG/DL (ref 0.5–1.4)
EOSINOPHIL # BLD AUTO: 0.22 K/UL (ref 0–0.51)
EOSINOPHIL NFR BLD: 3.8 % (ref 0–6.9)
ERYTHROCYTE [DISTWIDTH] IN BLOOD BY AUTOMATED COUNT: 45.7 FL (ref 35.9–50)
GFR SERPLBLD CREATININE-BSD FMLA CKD-EPI: 85 ML/MIN/1.73 M 2
GLOBULIN SER CALC-MCNC: 2.8 G/DL (ref 1.9–3.5)
GLUCOSE SERPL-MCNC: 99 MG/DL (ref 65–99)
HCT VFR BLD AUTO: 43.5 % (ref 37–47)
HGB BLD-MCNC: 14.9 G/DL (ref 12–16)
IMM GRANULOCYTES # BLD AUTO: 0.01 K/UL (ref 0–0.11)
IMM GRANULOCYTES NFR BLD AUTO: 0.2 % (ref 0–0.9)
LIPASE SERPL-CCNC: 39 U/L (ref 11–82)
LYMPHOCYTES # BLD AUTO: 1.93 K/UL (ref 1–4.8)
LYMPHOCYTES NFR BLD: 33 % (ref 22–41)
MCH RBC QN AUTO: 31.8 PG (ref 27–33)
MCHC RBC AUTO-ENTMCNC: 34.3 G/DL (ref 32.2–35.5)
MCV RBC AUTO: 92.8 FL (ref 81.4–97.8)
MONOCYTES # BLD AUTO: 0.55 K/UL (ref 0–0.85)
MONOCYTES NFR BLD AUTO: 9.4 % (ref 0–13.4)
NEUTROPHILS # BLD AUTO: 3.08 K/UL (ref 1.82–7.42)
NEUTROPHILS NFR BLD: 52.7 % (ref 44–72)
NRBC # BLD AUTO: 0 K/UL
NRBC BLD-RTO: 0 /100 WBC (ref 0–0.2)
PLATELET # BLD AUTO: 237 K/UL (ref 164–446)
PMV BLD AUTO: 9.2 FL (ref 9–12.9)
POTASSIUM SERPL-SCNC: 4.4 MMOL/L (ref 3.6–5.5)
PROT SERPL-MCNC: 7.1 G/DL (ref 6–8.2)
RBC # BLD AUTO: 4.69 M/UL (ref 4.2–5.4)
SODIUM SERPL-SCNC: 137 MMOL/L (ref 135–145)
WBC # BLD AUTO: 5.8 K/UL (ref 4.8–10.8)

## 2024-08-28 PROCEDURE — 85025 COMPLETE CBC W/AUTO DIFF WBC: CPT

## 2024-08-28 PROCEDURE — 82150 ASSAY OF AMYLASE: CPT

## 2024-08-28 PROCEDURE — 83690 ASSAY OF LIPASE: CPT

## 2024-08-28 PROCEDURE — 80053 COMPREHEN METABOLIC PANEL: CPT

## 2024-08-28 PROCEDURE — 3078F DIAST BP <80 MM HG: CPT | Performed by: PHYSICIAN ASSISTANT

## 2024-08-28 PROCEDURE — 3074F SYST BP LT 130 MM HG: CPT | Performed by: PHYSICIAN ASSISTANT

## 2024-08-28 PROCEDURE — 36415 COLL VENOUS BLD VENIPUNCTURE: CPT

## 2024-08-28 PROCEDURE — 99214 OFFICE O/P EST MOD 30 MIN: CPT | Performed by: PHYSICIAN ASSISTANT

## 2024-08-28 RX ORDER — SUCRALFATE 1 G/1
1 TABLET ORAL
Qty: 30 TABLET | Refills: 0 | Status: SHIPPED | OUTPATIENT
Start: 2024-08-28 | End: 2024-09-07

## 2024-08-28 ASSESSMENT — FIBROSIS 4 INDEX: FIB4 SCORE: 1.12

## 2024-08-28 NOTE — PROGRESS NOTES
Subjective:     CC: Abdominal pain, foot pain    HPI:   Rafaela here today with     Verbal consent was acquired by the patient to use [employer] ambient listening note generation during this visit No     History of Present Illness  The patient presents for evaluation of multiple medical concerns.    She reports persistent abdominal pain, which she suspects might be due to an ulcer. The pain intensifies with food intake, leading her to reduce her meal portions. Despite maintaining a healthy diet, she has recently switched to a different probiotic to alleviate her symptoms. She describes the pain as radiating and shooting, which is exacerbated by stress. Daily exercise and evening tea provide some relief. Probiotics have also been beneficial, but she still experiences discomfort after eating. She reports no presence of dark or bloody stools, but notes increased frequency and softness of bowel movements. She also reports excessive gas. She has tried essential oils, DigestZen, and peppermint for relief, but avoids Tums due to past negative experiences. She does not use anti-inflammatories like Advil or Aleve due to stomach sensitivity. She was tested for H. pylori in the past and maintains a strict diet at home due to gluten sensitivity and animal protein intolerance. She has dined out a few times in the past month. She experiences nausea but not heartburn. She usually drinks bottled water, but recalls using tap water for brushing teeth during a recent trip to [place], [place]. She admits to not taking her iron supplements regularly. She is due for a colonoscopy this year.    She has been experiencing edema in her left foot. An MRI was ordered, but she did not receive any follow-up. She attempted to contact the office but received no response. She feels the swelling has increased and is more noticeable. She recently noticed discoloration and swelling on the top of her foot, which is more pronounced on active days.  She reports no recent falls or injuries. She does not experience numbness or tingling, but the foot feels sensitive and irritated. She also reports shooting pain up the left side of her leg. She was referred to podiatry and had an MRI in 03/2024, but did not receive any results. She has never had a nerve conduction study. She recalls a 8-year-old injury where she hit her foot on a baby gate while running from a coyote. She was put in a boot and has had intermittent issues since then. She has tried foot injections for relief.              ROS:  All ROS negative except for pertinent positives listed above.       Current Outpatient Medications Ordered in Epic   Medication Sig Dispense Refill    sucralfate (CARAFATE) 1 GM Tab Take 1 Tablet by mouth 3 times a day before meals for 10 days. 30 Tablet 0    SYNTHROID 75 MCG Tab Take 1 Tablet by mouth every morning on an empty stomach. 90 Tablet 3    methylPREDNISolone (MEDROL DOSEPAK) 4 MG Tablet Therapy Pack As directed on the packaging label. 21 Tablet 0    liothyronine (CYTOMEL) 5 MCG Tab Take 1 Tablet by mouth every day. 90 Tablet 3    Ferrous Sulfate (IRON PO) Take 22 mg by mouth every 48 hours.      Non Formulary Request Take 4 Capsules by mouth every day. HYDROEYE      B Complex Vitamins (VITAMIN B COMPLEX PO) Take 1 Tablet by mouth every day.      vitamin D3 (VITAMIN D3) 5000 Unit (125 mcg) Tab Take 1,000 Units by mouth every day.      Probiotic Product (PROBIOTIC PO) Take 1 Capsule by mouth every day.      Zinc 50 MG Cap Take 50 mg by mouth every day.      QUERCETIN PO Take 4,000 mg by mouth every day.      Non Formulary Request Take 3 Capsules by mouth every day. WELLNESS FORMULA      Biotin 5000 MCG Tab Take 1 Tablet by mouth every day.      Ascorbic Acid (VITAMIN C) 1000 MG Tab Take 100 mg by mouth every day.      Cyanocobalamin (VITAMIN B-12) 1000 MCG Tab Take 1,000 mcg by mouth every day.       No current [facility]-ordered facility-administered medications on file.  "        Objective:     Exam:  /60 (BP Location: Right arm, Patient Position: Sitting, BP Cuff Size: Adult)   Pulse 73   Temp 36.6 °C (97.8 °F) (Temporal)   Resp 18   Ht 1.702 m (5' 7\")   Wt 65 kg (143 lb 4.8 oz)   LMP 03/12/2020 (Within Weeks)   SpO2 98%   BMI 22.44 kg/m²  Body mass index is 22.44 kg/m².    Gen: Alert and oriented, No apparent distress.  Neck: Neck is supple without lymphadenopathy.  Lungs: Normal effort, CTA bilaterally, no wheezes, rhonchi, or rales  CV: Regular rate and rhythm. No murmurs, rubs, or gallops.  Ext: No clubbing, cyanosis, edema.  ABD: Soft, tender to palpation epigastric region, nondistended, bowel sounds present in all 4 quadrants, Burgos sign negative, no palpable masses, no rebound tenderness  Foot exam: No lesions or calluses noted. 2+ pedal pulses.         Assessment & Plan:     53 y.o. female with the following -     Problem List Items Addressed This Visit    None  Visit Diagnoses       Colon cancer screening        Relevant Orders    Referral to GI for Colonoscopy    Epigastric pain        Relevant Medications    sucralfate (CARAFATE) 1 GM Tab    Other Relevant Orders    CBC WITH DIFFERENTIAL    H. PYLORI BREATH TEST    Comp Metabolic Panel    AMYLASE    LIPASE    Left foot pain        Relevant Orders    Referral to Neurodiagnostics (EEG,EP,EMG/NCS/DBS)          Assessment & Plan  1. Abdominal pain.  The abdominal discomfort may be indicative of a gastric ulcer, H. pylori infection, or gallbladder-related issues. Labs will be ordered to evaluate blood cell count, kidney and liver function, and pancreatic enzymes. Testing for H. pylori will also be conducted. She is advised to take sucralfate up to three times a day, ensuring it does not coincide with her eye supplements. If the lab results do not reveal any actionable findings, an ultrasound of the abdomen will be considered to further investigate the structural aspects.    2. Left foot edema.  The MRI results " do not indicate any structural abnormalities. The symptoms could potentially be due to nerve conduction issues. An EMG of the left lower extremity will be ordered to evaluate for any nerve conduction abnormalities.          I spent a total of 25 minutes with record review, exam, communication with the patient, communication with other providers, and documentation of this encounter.      No follow-ups on file.    Please note that this dictation was created using voice recognition software. I have made every reasonable attempt to correct obvious errors, but there may be errors of grammar and possibly content that I did not discover before finalizing the note.

## 2024-09-06 DIAGNOSIS — Z12.11 COLON CANCER SCREENING: ICD-10-CM

## 2024-10-10 ENCOUNTER — PATIENT MESSAGE (OUTPATIENT)
Dept: HEALTH INFORMATION MANAGEMENT | Facility: OTHER | Age: 54
End: 2024-10-10

## 2024-10-29 ENCOUNTER — APPOINTMENT (OUTPATIENT)
Dept: RADIOLOGY | Facility: MEDICAL CENTER | Age: 54
End: 2024-10-29
Attending: PHYSICIAN ASSISTANT
Payer: COMMERCIAL

## 2024-10-29 DIAGNOSIS — Z12.31 ENCOUNTER FOR SCREENING MAMMOGRAM FOR MALIGNANT NEOPLASM OF BREAST: ICD-10-CM

## 2024-10-29 PROCEDURE — 77067 SCR MAMMO BI INCL CAD: CPT

## 2025-01-28 DIAGNOSIS — Z85.850 HISTORY OF THYROID CANCER: ICD-10-CM

## 2025-01-28 DIAGNOSIS — E03.9 ACQUIRED HYPOTHYROIDISM: ICD-10-CM

## 2025-01-28 RX ORDER — LEVOTHYROXINE SODIUM 75 MCG
75 TABLET ORAL
Qty: 90 TABLET | Refills: 0 | Status: SHIPPED | OUTPATIENT
Start: 2025-01-28

## 2025-01-28 RX ORDER — LIOTHYRONINE SODIUM 5 UG/1
5 TABLET ORAL DAILY
Qty: 90 TABLET | Refills: 0 | Status: SHIPPED | OUTPATIENT
Start: 2025-01-28

## 2025-01-28 NOTE — TELEPHONE ENCOUNTER
Received request via: Pharmacy    Was the patient seen in the last year in this department? Yes    Does the patient have an active prescription (recently filled or refills available) for medication(s) requested? No    Pharmacy Name: [facility]    Does the patient have [facility] Plus and need 100-day supply? (This applies to ALL medications) Patient does not have SCP

## 2025-02-05 ENCOUNTER — PATIENT MESSAGE (OUTPATIENT)
Dept: MEDICAL GROUP | Facility: LAB | Age: 55
End: 2025-02-05

## 2025-02-05 ENCOUNTER — OFFICE VISIT (OUTPATIENT)
Dept: MEDICAL GROUP | Facility: LAB | Age: 55
End: 2025-02-05
Payer: COMMERCIAL

## 2025-02-05 VITALS
OXYGEN SATURATION: 97 % | TEMPERATURE: 97.6 F | HEIGHT: 67 IN | RESPIRATION RATE: 16 BRPM | BODY MASS INDEX: 23.36 KG/M2 | SYSTOLIC BLOOD PRESSURE: 118 MMHG | WEIGHT: 148.81 LBS | HEART RATE: 72 BPM | DIASTOLIC BLOOD PRESSURE: 62 MMHG

## 2025-02-05 DIAGNOSIS — M79.18 MUSCULOSKELETAL PAIN: ICD-10-CM

## 2025-02-05 DIAGNOSIS — M54.2 NECK PAIN: ICD-10-CM

## 2025-02-05 PROCEDURE — 99214 OFFICE O/P EST MOD 30 MIN: CPT | Performed by: PHYSICIAN ASSISTANT

## 2025-02-05 PROCEDURE — 3078F DIAST BP <80 MM HG: CPT | Performed by: PHYSICIAN ASSISTANT

## 2025-02-05 PROCEDURE — 3074F SYST BP LT 130 MM HG: CPT | Performed by: PHYSICIAN ASSISTANT

## 2025-02-05 RX ORDER — METHYLPREDNISOLONE 4 MG/1
TABLET ORAL
Qty: 21 TABLET | Refills: 0 | Status: SHIPPED | OUTPATIENT
Start: 2025-02-05

## 2025-02-05 RX ORDER — CYCLOBENZAPRINE HCL 5 MG
5 TABLET ORAL 3 TIMES DAILY PRN
Qty: 21 TABLET | Refills: 0 | Status: SHIPPED | OUTPATIENT
Start: 2025-02-05 | End: 2025-02-12

## 2025-02-05 RX ORDER — MELOXICAM 7.5 MG/1
7.5 TABLET ORAL DAILY
Qty: 30 TABLET | Refills: 0 | Status: SHIPPED | OUTPATIENT
Start: 2025-02-05

## 2025-02-05 ASSESSMENT — FIBROSIS 4 INDEX: FIB4 SCORE: 1.14

## 2025-02-05 ASSESSMENT — PATIENT HEALTH QUESTIONNAIRE - PHQ9: CLINICAL INTERPRETATION OF PHQ2 SCORE: 0
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
## 2025-02-07 ENCOUNTER — APPOINTMENT (OUTPATIENT)
Dept: MEDICAL GROUP | Facility: LAB | Age: 55
End: 2025-02-07
Payer: COMMERCIAL

## 2025-02-18 DIAGNOSIS — M54.2 NECK PAIN: ICD-10-CM

## 2025-02-18 DIAGNOSIS — M79.18 MUSCULOSKELETAL PAIN: ICD-10-CM

## 2025-02-18 RX ORDER — BACLOFEN 10 MG/1
10 TABLET ORAL 2 TIMES DAILY
Qty: 40 TABLET | Refills: 0 | Status: SHIPPED | OUTPATIENT
Start: 2025-02-18

## 2025-05-04 DIAGNOSIS — E03.9 ACQUIRED HYPOTHYROIDISM: ICD-10-CM

## 2025-05-04 DIAGNOSIS — Z85.850 HISTORY OF THYROID CANCER: ICD-10-CM

## 2025-05-05 RX ORDER — LEVOTHYROXINE SODIUM 75 MCG
75 TABLET ORAL
Qty: 90 TABLET | Refills: 0 | Status: SHIPPED | OUTPATIENT
Start: 2025-05-05

## 2025-05-05 RX ORDER — LIOTHYRONINE SODIUM 5 UG/1
5 TABLET ORAL DAILY
Qty: 90 TABLET | Refills: 0 | Status: SHIPPED | OUTPATIENT
Start: 2025-05-05

## 2025-05-05 NOTE — TELEPHONE ENCOUNTER
Received request via: Patient    Was the patient seen in the last year in this department? Yes    Does the patient have an active prescription (recently filled or refills available) for medication(s) requested? No    Pharmacy Name: [facility]    Does the patient have [facility] and need 100-day supply? (This applies to ALL medications) Patient does not have [facility]

## 2025-05-06 DIAGNOSIS — M54.12 CERVICAL RADICULOPATHY: ICD-10-CM

## 2025-05-06 DIAGNOSIS — M54.2 CERVICALGIA: ICD-10-CM

## 2025-06-05 ENCOUNTER — PATIENT MESSAGE (OUTPATIENT)
Dept: MEDICAL GROUP | Facility: LAB | Age: 55
End: 2025-06-05
Payer: COMMERCIAL

## 2025-06-05 DIAGNOSIS — M54.12 CERVICAL RADICULOPATHY: Primary | ICD-10-CM

## 2025-06-05 DIAGNOSIS — M54.2 CERVICALGIA: ICD-10-CM
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
## 2025-06-18 ENCOUNTER — PATIENT MESSAGE (OUTPATIENT)
Dept: MEDICAL GROUP | Facility: LAB | Age: 55
End: 2025-06-18
Payer: COMMERCIAL

## 2025-06-18 DIAGNOSIS — Z12.83 SKIN CANCER SCREENING: Primary | ICD-10-CM

## 2025-06-25 ENCOUNTER — PATIENT MESSAGE (OUTPATIENT)
Dept: MEDICAL GROUP | Facility: LAB | Age: 55
End: 2025-06-25
Payer: COMMERCIAL

## 2025-06-25 DIAGNOSIS — Z12.83 SKIN CANCER SCREENING: Primary | ICD-10-CM

## 2025-07-07 DIAGNOSIS — Z12.83 SKIN CANCER SCREENING: Primary | ICD-10-CM

## 2025-07-09 ENCOUNTER — PATIENT MESSAGE (OUTPATIENT)
Dept: MEDICAL GROUP | Facility: LAB | Age: 55
End: 2025-07-09
Payer: COMMERCIAL

## 2025-07-09 DIAGNOSIS — H04.129 DRY EYE: Primary | ICD-10-CM

## 2025-07-17 ENCOUNTER — PATIENT MESSAGE (OUTPATIENT)
Dept: MEDICAL GROUP | Facility: LAB | Age: 55
End: 2025-07-17
Payer: COMMERCIAL

## 2025-07-17 DIAGNOSIS — E03.9 ACQUIRED HYPOTHYROIDISM: ICD-10-CM

## 2025-07-17 DIAGNOSIS — Z85.850 HISTORY OF THYROID CANCER: ICD-10-CM

## 2025-07-18 RX ORDER — LIOTHYRONINE SODIUM 5 UG/1
5 TABLET ORAL DAILY
Qty: 90 TABLET | Refills: 3 | Status: SHIPPED | OUTPATIENT
Start: 2025-07-18

## 2025-07-18 RX ORDER — LEVOTHYROXINE SODIUM 75 MCG
75 TABLET ORAL
Qty: 90 TABLET | Refills: 3 | Status: SHIPPED | OUTPATIENT
Start: 2025-07-18

## 2025-07-21 ENCOUNTER — HOSPITAL ENCOUNTER (OUTPATIENT)
Dept: RADIOLOGY | Facility: MEDICAL CENTER | Age: 55
End: 2025-07-21
Attending: PHYSICIAN ASSISTANT
Payer: COMMERCIAL

## 2025-07-21 DIAGNOSIS — M54.2 CERVICALGIA: ICD-10-CM
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
## 2025-07-21 DIAGNOSIS — M54.12 CERVICAL RADICULOPATHY: ICD-10-CM

## 2025-07-21 PROCEDURE — 72141 MRI NECK SPINE W/O DYE: CPT

## 2025-08-13 ENCOUNTER — NON-PROVIDER VISIT (OUTPATIENT)
Dept: NEUROLOGY | Facility: MEDICAL CENTER | Age: 55
End: 2025-08-13
Attending: STUDENT IN AN ORGANIZED HEALTH CARE EDUCATION/TRAINING PROGRAM
Payer: COMMERCIAL

## 2025-08-13 DIAGNOSIS — M79.672 LEFT FOOT PAIN: Primary | ICD-10-CM

## 2025-08-13 PROCEDURE — 95910 NRV CNDJ TEST 7-8 STUDIES: CPT | Mod: 26 | Performed by: STUDENT IN AN ORGANIZED HEALTH CARE EDUCATION/TRAINING PROGRAM

## 2025-08-13 PROCEDURE — 95886 MUSC TEST DONE W/N TEST COMP: CPT | Performed by: STUDENT IN AN ORGANIZED HEALTH CARE EDUCATION/TRAINING PROGRAM

## 2025-08-13 PROCEDURE — 95885 MUSC TST DONE W/NERV TST LIM: CPT | Performed by: STUDENT IN AN ORGANIZED HEALTH CARE EDUCATION/TRAINING PROGRAM

## 2025-08-13 PROCEDURE — 95886 MUSC TEST DONE W/N TEST COMP: CPT | Mod: 26,59 | Performed by: STUDENT IN AN ORGANIZED HEALTH CARE EDUCATION/TRAINING PROGRAM

## 2025-08-13 PROCEDURE — 95885 MUSC TST DONE W/NERV TST LIM: CPT | Mod: 26,59 | Performed by: STUDENT IN AN ORGANIZED HEALTH CARE EDUCATION/TRAINING PROGRAM

## 2025-08-13 PROCEDURE — 95910 NRV CNDJ TEST 7-8 STUDIES: CPT | Performed by: STUDENT IN AN ORGANIZED HEALTH CARE EDUCATION/TRAINING PROGRAM

## 2025-09-02 ENCOUNTER — APPOINTMENT (OUTPATIENT)
Dept: MEDICAL GROUP | Facility: LAB | Age: 55
End: 2025-09-02
Payer: COMMERCIAL